# Patient Record
Sex: FEMALE | Race: WHITE | Employment: FULL TIME | URBAN - NONMETROPOLITAN AREA
[De-identification: names, ages, dates, MRNs, and addresses within clinical notes are randomized per-mention and may not be internally consistent; named-entity substitution may affect disease eponyms.]

---
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
## 2020-01-07 ENCOUNTER — HOSPITAL ENCOUNTER (OUTPATIENT)
Age: 20
Discharge: HOME | End: 2020-01-07
Payer: COMMERCIAL

## 2020-01-07 LAB
BASOPHILS # BLD: 0.2 %
BASOPHILS ABSOLUTE: 0 THOU/MM3 (ref 0–0.1)
ELLIPTOCYTES: ABNORMAL
EOSINOPHIL # BLD: 1.9 %
EOSINOPHILS ABSOLUTE: 0.1 THOU/MM3 (ref 0–0.4)
ERYTHROCYTE [DISTWIDTH] IN BLOOD BY AUTOMATED COUNT: 20.8 % (ref 11.5–14.5)
ERYTHROCYTE [DISTWIDTH] IN BLOOD BY AUTOMATED COUNT: 61.4 FL (ref 35–45)
HCT VFR BLD CALC: 39.7 % (ref 37–47)
HEMOGLOBIN: 11.4 GM/DL (ref 12–16)
HYPOCHROMIA: PRESENT
IMMATURE GRANS (ABS): 0 THOU/MM3 (ref 0–0.07)
IMMATURE GRANULOCYTES: 0 %
LYMPHOCYTES # BLD: 32.4 %
LYMPHOCYTES ABSOLUTE: 1.4 THOU/MM3 (ref 1–4.8)
MCH RBC QN AUTO: 23.5 PG (ref 26–33)
MCHC RBC AUTO-ENTMCNC: 28.7 GM/DL (ref 32.2–35.5)
MCV RBC AUTO: 81.9 FL (ref 81–99)
MONOCYTES # BLD: 10.1 %
MONOCYTES ABSOLUTE: 0.4 THOU/MM3 (ref 0.4–1.3)
NUCLEATED RED BLOOD CELLS: 0 /100 WBC
PLATELET # BLD: 235 THOU/MM3 (ref 130–400)
PLATELET ESTIMATE: ADEQUATE
PMV BLD AUTO: 10.6 FL (ref 9.4–12.4)
POIKILOCYTES: ABNORMAL
RBC # BLD: 4.85 MILL/MM3 (ref 4.2–5.4)
SCAN OF BLOOD SMEAR: NORMAL
SEG NEUTROPHILS: 55.4 %
SEGMENTED NEUTROPHILS ABSOLUTE COUNT: 2.4 THOU/MM3 (ref 1.8–7.7)
WBC # BLD: 4.3 THOU/MM3 (ref 4.8–10.8)

## 2020-01-07 PROCEDURE — 85025 COMPLETE CBC W/AUTO DIFF WBC: CPT

## 2020-01-07 PROCEDURE — 36415 COLL VENOUS BLD VENIPUNCTURE: CPT

## 2020-02-26 ENCOUNTER — HOSPITAL ENCOUNTER (EMERGENCY)
Age: 20
Discharge: HOME | End: 2020-02-26
Payer: COMMERCIAL

## 2020-02-26 ENCOUNTER — APPOINTMENT (OUTPATIENT)
Dept: GENERAL RADIOLOGY | Age: 20
End: 2020-02-26
Payer: COMMERCIAL

## 2020-02-26 VITALS
RESPIRATION RATE: 14 BRPM | OXYGEN SATURATION: 100 % | BODY MASS INDEX: 28.19 KG/M2 | SYSTOLIC BLOOD PRESSURE: 109 MMHG | DIASTOLIC BLOOD PRESSURE: 66 MMHG | WEIGHT: 145 LBS | TEMPERATURE: 98.2 F | HEART RATE: 94 BPM

## 2020-02-26 LAB
ANION GAP SERPL CALCULATED.3IONS-SCNC: 10 MEQ/L (ref 8–16)
ANISOCYTOSIS: PRESENT
BASOPHILS # BLD: 0.6 %
BASOPHILS ABSOLUTE: 0 THOU/MM3 (ref 0–0.1)
BUN BLDV-MCNC: 10 MG/DL (ref 7–22)
CALCIUM SERPL-MCNC: 9.3 MG/DL (ref 8.5–10.5)
CHLORIDE BLD-SCNC: 108 MEQ/L (ref 98–111)
CO2: 25 MEQ/L (ref 23–33)
CREAT SERPL-MCNC: 0.5 MG/DL (ref 0.4–1.2)
EKG ATRIAL RATE: 86 BPM
EKG P AXIS: 16 DEGREES
EKG P-R INTERVAL: 120 MS
EKG Q-T INTERVAL: 342 MS
EKG QRS DURATION: 80 MS
EKG QTC CALCULATION (BAZETT): 409 MS
EKG R AXIS: 56 DEGREES
EKG T AXIS: 55 DEGREES
EKG VENTRICULAR RATE: 86 BPM
EOSINOPHIL # BLD: 1.1 %
EOSINOPHILS ABSOLUTE: 0.1 THOU/MM3 (ref 0–0.4)
ERYTHROCYTE [DISTWIDTH] IN BLOOD BY AUTOMATED COUNT: 17.2 % (ref 11.5–14.5)
ERYTHROCYTE [DISTWIDTH] IN BLOOD BY AUTOMATED COUNT: 49.2 FL (ref 35–45)
GLUCOSE BLD-MCNC: 93 MG/DL (ref 70–108)
HCT VFR BLD CALC: 34.1 % (ref 37–47)
HEMOGLOBIN: 9.5 GM/DL (ref 12–16)
HYPOCHROMIA: PRESENT
IMMATURE GRANS (ABS): 0.01 THOU/MM3 (ref 0–0.07)
IMMATURE GRANULOCYTES: 0.2 %
LYMPHOCYTES # BLD: 27.3 %
LYMPHOCYTES ABSOLUTE: 1.8 THOU/MM3 (ref 1–4.8)
MCH RBC QN AUTO: 22.1 PG (ref 26–33)
MCHC RBC AUTO-ENTMCNC: 27.9 GM/DL (ref 32.2–35.5)
MCV RBC AUTO: 79.5 FL (ref 81–99)
MONOCYTES # BLD: 10.4 %
MONOCYTES ABSOLUTE: 0.7 THOU/MM3 (ref 0.4–1.3)
NUCLEATED RED BLOOD CELLS: 0 /100 WBC
OSMOLALITY CALCULATION: 283.7 MOSMOL/KG (ref 275–300)
PLATELET # BLD: 380 THOU/MM3 (ref 130–400)
PMV BLD AUTO: 10.4 FL (ref 9.4–12.4)
POIKILOCYTES: ABNORMAL
POTASSIUM SERPL-SCNC: 4 MEQ/L (ref 3.5–5.2)
PREGNANCY, SERUM: NEGATIVE
RBC # BLD: 4.29 MILL/MM3 (ref 4.2–5.4)
ROULEAUX: SLIGHT
SCAN OF BLOOD SMEAR: NORMAL
SEG NEUTROPHILS: 60.4 %
SEGMENTED NEUTROPHILS ABSOLUTE COUNT: 3.9 THOU/MM3 (ref 1.8–7.7)
SODIUM BLD-SCNC: 143 MEQ/L (ref 135–145)
SPHEROCYTES: ABNORMAL
TSH SERPL DL<=0.05 MIU/L-ACNC: 0.75 UIU/ML (ref 0.4–4.2)
WBC # BLD: 6.5 THOU/MM3 (ref 4.8–10.8)

## 2020-02-26 PROCEDURE — 36415 COLL VENOUS BLD VENIPUNCTURE: CPT

## 2020-02-26 PROCEDURE — 99282 EMERGENCY DEPT VISIT SF MDM: CPT

## 2020-02-26 PROCEDURE — 93005 ELECTROCARDIOGRAM TRACING: CPT | Performed by: PHYSICIAN ASSISTANT

## 2020-02-26 PROCEDURE — 84443 ASSAY THYROID STIM HORMONE: CPT

## 2020-02-26 PROCEDURE — 80048 BASIC METABOLIC PNL TOTAL CA: CPT

## 2020-02-26 PROCEDURE — 84703 CHORIONIC GONADOTROPIN ASSAY: CPT

## 2020-02-26 PROCEDURE — 85025 COMPLETE CBC W/AUTO DIFF WBC: CPT

## 2020-02-26 PROCEDURE — 71046 X-RAY EXAM CHEST 2 VIEWS: CPT

## 2020-02-26 RX ORDER — FERROUS SULFATE 325(65) MG
325 TABLET ORAL 2 TIMES DAILY
Qty: 60 TABLET | Refills: 0 | Status: SHIPPED | OUTPATIENT
Start: 2020-02-26 | End: 2022-05-29

## 2020-02-26 ASSESSMENT — ENCOUNTER SYMPTOMS
EYE ITCHING: 0
BLOOD IN STOOL: 0
ANAL BLEEDING: 0
BACK PAIN: 0
RHINORRHEA: 0
NAUSEA: 0
SHORTNESS OF BREATH: 1
DIARRHEA: 0
EYE DISCHARGE: 0
COUGH: 0
EYE PAIN: 0
SORE THROAT: 0
ABDOMINAL PAIN: 0
VOMITING: 0
COLOR CHANGE: 0
WHEEZING: 0

## 2020-02-26 NOTE — ED PROVIDER NOTES
[facility] [address] COMPLAINT       Chief Complaint   Patient presents with    Other     needs iron checked    Other     wants pregnancy test       Nurses Notes reviewed and I agree except as notedin the HPI. HISTORY OF PRESENT ILLNESS    Brie Verdin is a 23 y.o. female who presents with a complaint of needing her iron checked and wants a pregnancy test. The patient admits to shortness of breath on exertion. The patient states that for the last week she has been craving and eating ice more often and has cold intolerance. She admits to a history of anemia. In spring of 2019 the patient was prescribed iron and had blood transfusions. The patient does not take any medications and has not taken iron supplementation since spring of 2019. The patient also reports that during sexual intercourse with her boyfriend the condom broke. The patient is concerned that she may be pregnant. Location/Symptom: SOB on exertion   Timing/Onset: One week  Context/Setting: climbing stairs  Quality: n/a  Duration: intermittent   Modifying Factors: ambulation  Severity: 0/10  REVIEW OF SYSTEMS     Review of Systems   Constitutional: Positive for fatigue. Negative for activity change, appetite change, chills and fever. HENT: Negative for congestion, ear pain, rhinorrhea and sore throat. Eyes: Negative for pain, discharge and itching. Respiratory: Positive for shortness of breath. Negative for cough and wheezing. Cardiovascular: Negative for chest pain. Gastrointestinal: Negative for abdominal pain, anal bleeding, blood in stool, diarrhea, nausea and vomiting. Endocrine: Positive for cold intolerance. Genitourinary: Negative for difficulty urinating, dysuria and vaginal bleeding. Musculoskeletal: Negative for arthralgias, back pain and myalgias. Skin: Negative for color change and rash.    Neurological: Negative for dizziness, seizures, light-headedness and study.    TECHNIQUE: PA and lateral views the chest.    FINDINGS:  No lobar consolidation. Costophrenic recesses are preserved. No cardiomegaly. No acute osseous findings.                      LABS:   Labs Reviewed   CBC WITH AUTO DIFFERENTIAL - Abnormal; Notable for the following components:       Result Value    Hemoglobin 9.5 (*)     Hematocrit 34.1 (*)     MCV 79.5 (*)     MCH 22.1 (*)     MCHC 27.9 (*)     RDW-CV 17.2 (*)     RDW-SD 49.2 (*)     All other components within normal limits   BASIC METABOLIC PANEL   HCG, SERUM, QUALITATIVE   TSH WITH REFLEX   SCAN OF BLOOD SMEAR   ANION GAP   OSMOLALITY       EMERGENCY DEPARTMENT COURSE:   :    Vitals:    02/26/20 1402 02/26/20 1404 02/26/20 1456   BP: 123/81  109/66   Pulse: 98  94   Resp: 16  14   Temp:  98.2 °F (36.8 °C)    TempSrc:  Oral    SpO2: 100%  100%   Weight: 145 lb (65.8 kg)       Patient was seen history physical exam was performed. Was given. We will start the patient back on iron that she is supposed to be on but has not been on since November. See disposition below    CRITICAL CARE:  None    CONSULTS:  None    PROCEDURES:  None    FINAL IMPRESSION      1. Iron deficiency anemia, unspecified iron deficiency anemia type          DISPOSITION/PLAN   Discharge    PATIENT REFERRED TO:  MOHAN Glass - CNP  [address]  [address]  [phone]    In 2 days        DISCHARGE MEDICATIONS:  Discharge Medication List as of 2/26/2020  3:29 PM      START taking these medications    Details   !! ferrous sulfate 325 (65 Fe) MG tablet Take 1 tablet by mouth 2 times daily, Disp-60 tablet, R-0Print       !! - Potential duplicate medications found. Please discuss with provider.           (Please note that portions of this note were completed with a voice recognitionprogram.  Efforts were made to edit the dictations but occasionally words are mis-transcribed.)    ASHLEIGH Robles            [place], [place]  02/26/20 2039

## 2020-02-26 NOTE — ED TRIAGE NOTES
Patient presents to the ED with concerns of low iron due to fatigue and eating ice chips \"a lot more lately\". She states she had extremely low hgb awhile back and was admitted. She has not been taking iron supplements per advisement from her doctor. She recently had an ovary removed which was the source of the low hgb. She has concerns of a possible pregnancy. VSS.

## 2020-02-27 PROCEDURE — 93010 ELECTROCARDIOGRAM REPORT: CPT | Performed by: INTERNAL MEDICINE

## 2021-12-07 ENCOUNTER — HOSPITAL ENCOUNTER (EMERGENCY)
Age: 21
Discharge: LEFT BEFORE DISCHARGE | End: 2021-12-07
Attending: EMERGENCY MEDICINE
Payer: COMMERCIAL

## 2021-12-07 VITALS
OXYGEN SATURATION: 99 % | RESPIRATION RATE: 18 BRPM | TEMPERATURE: 98 F | SYSTOLIC BLOOD PRESSURE: 139 MMHG | DIASTOLIC BLOOD PRESSURE: 77 MMHG | HEART RATE: 122 BPM

## 2021-12-07 DIAGNOSIS — R11.0 NAUSEA: Primary | ICD-10-CM

## 2021-12-07 PROCEDURE — 99281 EMR DPT VST MAYX REQ PHY/QHP: CPT

## 2021-12-07 NOTE — ED PROVIDER NOTES
[address] [address]          Pt Name: Jhony Saez  MRN: [id number]  [place] 2000  Date of evaluation: 12/7/2021  Treating Resident Physician: Isaac Doe MD  Supervising Physician: Dr. Worrell [id number]       Chief Complaint   Patient presents with    Nausea     History obtained from unobtainable from patient due to patient eloped. HISTORY OF PRESENT ILLNESS    HPI  Jhony Saez is a 24 y.o. female who presents to the emergency department for evaluation of nausea. Patient eloped before I evaluated her. REVIEW OF SYSTEMS   Review of Systems   Unable to perform ROS: Other         PAST MEDICAL AND SURGICAL HISTORY     Past Medical History:   Diagnosis Date    Depression with anxiety     Iron deficiency anemia due to chronic blood loss     Migraine headache      Past Surgical History:   Procedure Laterality Date    WISDOM TOOTH EXTRACTION  04/11/2018    all of them         MEDICATIONS   No current facility-administered medications for this encounter.     Current Outpatient Medications:     ferrous sulfate 325 (65 Fe) MG tablet, Take 1 tablet by mouth 2 times daily, Disp: 60 tablet, Rfl: 0    cyanocobalamin 1000 MCG tablet, Take 2 tablets by mouth daily, Disp: 30 tablet, Rfl: 3    SETLAKIN 0.15-0.03 MG per tablet, Take 1 tablet by mouth daily, Disp: , Rfl: 0    ferrous sulfate 325 (65 Fe) MG tablet, Take 1 tablet by mouth 3 times daily (with meals), Disp: 30 tablet, Rfl: 3    folic acid (FOLVITE) 1 MG tablet, Take 1 tablet by mouth daily, Disp: 30 tablet, Rfl: 3    vitamin C (VITAMIN C) 500 MG tablet, Take 1 tablet by mouth daily, Disp: 30 tablet, Rfl: 3    butalbital-acetaminophen-caffeine (ESGIC) -40 MG per tablet, Take 1 tablet by mouth every 4 hours as needed for Headaches, Disp: 20 tablet, Rfl: 0      SOCIAL HISTORY     Social History     Social History Narrative    Not on file     Social History Tobacco Use    Smoking status: Never Smoker    Smokeless tobacco: Never Used   Vaping Use    Vaping Use: Never used   Substance Use Topics    Alcohol use: No    Drug use: Yes     Types: Marijuana (Weed)         ALLERGIES   No Known Allergies      FAMILY HISTORY     Family History   Problem Relation Age of Onset    Diabetes Mother     High Blood Pressure Mother     Crohn's Disease Father     High Blood Pressure Father     Cancer Maternal Grandmother         lung         PREVIOUS RECORDS   Previous records reviewed. PHYSICAL EXAM     ED Triage Vitals [12/07/21 1610]   BP Temp Temp Source Pulse Resp SpO2 Height Weight   139/77 98 °F (36.7 °C) Oral 122 18 99 % -- --     Initial vital signs and nursing assessment reviewed and abnormal from Hypertension, tachycardia. There is no height or weight on file to calculate BMI. Pulsoximetry is normal per my interpretation. Additional Vital Signs:  Vitals:    12/07/21 1610   BP: 139/77   Pulse: 122   Resp: 18   Temp: 98 °F (36.7 °C)   SpO2: 99%             MEDICAL DECISION MAKING   Initial Assessment:   1. Complaints of nausea. Plan:    Patient eloped before evaluation. I did not have a chance to see her she had left the emergency department before evaluation. ED RESULTS   Laboratory results:  Labs Reviewed   CBC WITH AUTO DIFFERENTIAL   BASIC METABOLIC PANEL   HCG, SERUM, QUALITATIVE       Radiologic studies results:  No orders to display       ED Medications administered this visit: Medications - No data to display      ED COURSE              MEDICATION CHANGES     New Prescriptions    No medications on file         FINAL DISPOSITION     Final diagnoses:   None     Dispo: Eloped      This transcription was electronically signed. Parts of this transcriptions may have been dictated by use of voice recognition software and electronically transcribed, and parts may have been transcribed with the assistance of an ED scribe.  The transcription may contain errors not detected in proofreading. Please refer to my supervising physician's documentation if my documentation differs.     Electronically Signed: Sofía Rodriguez MD, 12/07/21, 4:36 PM          Sofía Rodriguez MD  Resident  12/07/21 4654

## 2021-12-07 NOTE — ED NOTES
Pt left at this time. Pt states that she is going to the other ER.       Maximiliano Bishop, [place]  68/[birth year]/64 0941

## 2021-12-07 NOTE — ED TRIAGE NOTES
Pt to er. Pt c/o nausea. States for past 2 days.  was seen at [facility] yesterday for headache and nausea. States everything was normal. Domenico has had some dizziness also at time.

## 2021-12-08 NOTE — ED PROVIDER NOTES
I did not have a chance to see the patient before she eloped. IMPRESSION  1.  Nausea      Patient eloped from ED     Eliza Gonzalez MD  12/08/21 [address] Concepcion MD  12/10/21 6092

## 2021-12-28 ENCOUNTER — HOSPITAL ENCOUNTER (EMERGENCY)
Age: 21
Discharge: HOME | End: 2021-12-28
Payer: COMMERCIAL

## 2021-12-28 VITALS
OXYGEN SATURATION: 97 % | BODY MASS INDEX: 25.66 KG/M2 | RESPIRATION RATE: 16 BRPM | TEMPERATURE: 98.3 F | WEIGHT: 132 LBS | HEART RATE: 76 BPM | DIASTOLIC BLOOD PRESSURE: 68 MMHG | SYSTOLIC BLOOD PRESSURE: 126 MMHG

## 2021-12-28 DIAGNOSIS — U07.1 COVID-19: Primary | ICD-10-CM

## 2021-12-28 LAB — SARS-COV-2, NAA: DETECTED

## 2021-12-28 PROCEDURE — 99213 OFFICE O/P EST LOW 20 MIN: CPT

## 2021-12-28 PROCEDURE — 87635 SARS-COV-2 COVID-19 AMP PRB: CPT

## 2021-12-28 PROCEDURE — 99202 OFFICE O/P NEW SF 15 MIN: CPT | Performed by: NURSE PRACTITIONER

## 2021-12-28 ASSESSMENT — ENCOUNTER SYMPTOMS
NAUSEA: 0
SHORTNESS OF BREATH: 0
COUGH: 1
DIARRHEA: 0
RHINORRHEA: 0
TROUBLE SWALLOWING: 0
BACK PAIN: 0
VOMITING: 0
SINUS PRESSURE: 0
SORE THROAT: 0

## 2021-12-28 NOTE — ED TRIAGE NOTES
Pt to room 8 with c/o a cough, nasal congestion, loss of taste and smell and states she was told that she was exposed to someone who tested positive for Covid at work.

## 2021-12-28 NOTE — ED PROVIDER NOTES
[facility]       CHIEF COMPLAINT       Chief Complaint   Patient presents with    Concern For COVID-19    Cough    Headache    Other     loss of taste and smell       Nurses Notes reviewed and I agree except as noted in the HPI. HISTORY OF PRESENT ILLNESS   Marnie uSarez is a 24 y.o. female who presents the urgent care center complaining of loss of taste and smell nonproductive cough and a dull headache intermittently over the past 3 or 4 days. Patient states that she was exposed to Covid and does work with public. Patient at present time sitting in chair skin is warm dry patient does not appear to be in any acute distress    The history is provided by the patient. No  was used. REVIEW OF SYSTEMS     Review of Systems   Constitutional: Negative for activity change, appetite change, chills, fatigue and fever. HENT: Negative for congestion, ear pain, rhinorrhea, sinus pressure, sore throat and trouble swallowing. Respiratory: Positive for cough. Negative for shortness of breath. Cardiovascular: Negative for chest pain. Gastrointestinal: Negative for diarrhea, nausea and vomiting. Musculoskeletal: Negative for back pain and neck stiffness. Skin: Negative for rash. Allergic/Immunologic: Negative for environmental allergies. Neurological: Positive for headaches. Negative for dizziness and light-headedness. Hematological: Negative for adenopathy. PAST MEDICAL HISTORY         Diagnosis Date    Depression with anxiety     Iron deficiency anemia due to chronic blood loss     Migraine headache        SURGICALHISTORY     Patient  has a past surgical history that includes [place] tooth extraction (04/11/2018) and Uterine fibroid surgery.     CURRENT MEDICATIONS       Current Discharge Medication List      CONTINUE these medications which have NOT CHANGED    Details   !! ferrous sulfate 325 (65 Fe) MG tablet Take 1 tablet by mouth 2 times daily  Qty: 60 tablet, Refills: 0      cyanocobalamin 1000 MCG tablet Take 2 tablets by mouth daily  Qty: 30 tablet, Refills: 3      SETLAKIN 0.15-0.03 MG per tablet Take 1 tablet by mouth daily  Refills: 0      !! ferrous sulfate 325 (65 Fe) MG tablet Take 1 tablet by mouth 3 times daily (with meals)  Qty: 30 tablet, Refills: 3      folic acid (FOLVITE) 1 MG tablet Take 1 tablet by mouth daily  Qty: 30 tablet, Refills: 3      vitamin C (VITAMIN C) 500 MG tablet Take 1 tablet by mouth daily  Qty: 30 tablet, Refills: 3      butalbital-acetaminophen-caffeine (ESGIC) -40 MG per tablet Take 1 tablet by mouth every 4 hours as needed for Headaches  Qty: 20 tablet, Refills: 0       !! - Potential duplicate medications found. Please discuss with provider. ALLERGIES     Patient is has No Known Allergies. Patients   Immunization History   Administered Date(s) Administered    Influenza Virus Vaccine 09/19/2018       FAMILY HISTORY     Patient's family history includes Cancer in her maternal grandmother; Crohn's Disease in her father; Diabetes in her mother; High Blood Pressure in her father and mother. SOCIAL HISTORY     Patient  reports that she has never smoked. She has never used smokeless tobacco. She reports current drug use. Drug: Marijuana Katelynn Delatorre). She reports that she does not drink alcohol. PHYSICAL EXAM     ED TRIAGE VITALS  BP: 126/68, Temp: 98.3 °F (36.8 °C), Pulse: 76, Resp: 16, SpO2: 97 %,Estimated body mass index is 25.66 kg/m² as calculated from the following:    Height as of 5/7/19: 5' 0.14\" (1.528 m). Weight as of this encounter: 132 lb (59.9 kg). ,Patient's last menstrual period was 11/29/2021. Physical Exam  Vitals and nursing note reviewed. Constitutional:       General: She is not in acute distress. Appearance: Normal appearance. She is well-developed and well-groomed. She is not ill-appearing, toxic-appearing or diaphoretic.    HENT:      Head: Normocephalic. Right Ear: Hearing, tympanic membrane, ear canal and external ear normal. No drainage, swelling or tenderness. No mastoid tenderness. Left Ear: Hearing, tympanic membrane, ear canal and external ear normal. No drainage, swelling or tenderness. No mastoid tenderness. Nose: Nose normal.      Right Sinus: No maxillary sinus tenderness or frontal sinus tenderness. Left Sinus: No maxillary sinus tenderness or frontal sinus tenderness. Mouth/Throat:      Lips: Pink. Mouth: Mucous membranes are moist.      Pharynx: Uvula midline. No posterior oropharyngeal erythema or uvula swelling. Tonsils: No tonsillar exudate or tonsillar abscesses. Eyes:      Conjunctiva/sclera: Conjunctivae normal.      Pupils: Pupils are equal, round, and reactive to light. Cardiovascular:      Rate and Rhythm: Normal rate and regular rhythm. Heart sounds: Normal heart sounds. Pulmonary:      Effort: Pulmonary effort is normal. No accessory muscle usage. Breath sounds: Normal breath sounds. No decreased breath sounds, wheezing, rhonchi or rales. Chest:   Breasts:      Right: No supraclavicular adenopathy. Left: No supraclavicular adenopathy. Abdominal:      General: Bowel sounds are normal.      Palpations: Abdomen is soft. Tenderness: There is no abdominal tenderness. There is no right CVA tenderness, left CVA tenderness or guarding. Negative signs include Oglesby's sign. Musculoskeletal:      Cervical back: Full passive range of motion without pain and normal range of motion. No rigidity. Lymphadenopathy:      Head:      Right side of head: No submental, submandibular, tonsillar, preauricular, posterior auricular or occipital adenopathy. Left side of head: No submental, submandibular, tonsillar, preauricular, posterior auricular or occipital adenopathy. Cervical: No cervical adenopathy.       Right cervical: No superficial, deep or posterior cervical medication as needed. Pt is advised to go to ER if symptoms worsen, new symptoms develop, high fever >100, chest pain or heaviness, breathing difficulty, lethargy to Dial 911 or call [facility] [place] COVID-19 hotline number [phone] or your local [address] [place] [address] Christelle. The patient or patient's representative is agreeable to the treatment plan they're advised to follow-up with her primary care provider in one week for reevaluation.       PATIENT REFERRED TO:  MOHAN Blackmon CNP  [address] [address]      DISCHARGE MEDICATIONS:  Current Discharge Medication List          Current Discharge Medication List          Current Discharge Medication List          MOHAN Holley CNP    (Please note that portions of this note were completed with a voice recognition program. Efforts were made to edit the dictations but occasionally words are mis-transcribed.)           MOHAN Holley CNP  12/28/21 5241

## 2021-12-28 NOTE — LETTER
[facility]  [address]  [address]  Phone: [phone]               December 28, 2021    Patient: Aishwarya Newberry   YOB: 2000   Date of Visit: 12/28/2021       To Whom It May Concern:    Kadi Hackett was seen and treated in our emergency department on 12/28/2021. The patient did test positive for COVID-19 today. They should be off work 10 days from the start of symptoms and fever free for 24 hours or follow the policies of their human resources department. If the patient needs FMLA papers filled out they must go through their primary care provider to have this completed       If there are questions or concerns, please have the patient contact our office.       Sincerely,       MOHAN Sotomayor CNP         Signature:__________________________________

## 2021-12-29 ENCOUNTER — CARE COORDINATION (OUTPATIENT)
Dept: CARE COORDINATION | Age: 21
End: 2021-12-29

## 2021-12-29 NOTE — CARE COORDINATION
Patient contacted regarding COVID-19 diagnosis. Discussed COVID-19 related testing which was available at this time. Test results were positive. Patient informed of results, if available? Yes. Ambulatory Care Manager contacted the patient by telephone to perform post discharge assessment. Call within 2 business days of discharge: Yes. Verified name and  with patient as identifiers. Provided introduction to self, and explanation of the CTN/ACM role, and reason for call due to risk factors for infection and/or exposure to COVID-19. Symptoms reviewed with patient who verbalized the following symptoms: cough, loss of taste or smell and headache. Due to no new or worsening symptoms encounter was not routed to provider for escalation. Discussed follow-up appointments. If no appointment was previously scheduled, appointment scheduling offered: Yes. [address] follow up appointment(s): No future appointments. Non-[facility] follow up appointment(s): patient calling    Non-face-to-face services provided:  Obtained and reviewed discharge summary and/or continuity of care documents     Advance Care Planning:   Does patient have an Advance Directive:  reviewed and current. Educated patient about risk for severe COVID-19 due to risk factors according to CDC guidelines. ACM reviewed discharge instructions, medical action plan and red flag symptoms with the patient who verbalized understanding. Discussed COVID vaccination status: No. Education provided on COVID-19 vaccination as appropriate. Discussed exposure protocols and quarantine with CDC Guidelines. Patient was given an opportunity to verbalize any questions and concerns and agrees to contact ACM or health care provider for questions related to their healthcare. Reviewed and educated patient on any new and changed medications related to discharge diagnosis     Was patient discharged with a pulse oximeter?  No Discussed and confirmed pulse oximeter discharge instructions and when to notify provider or seek emergency care. ACM provided contact information. No further follow-up call identified based on severity of symptoms and risk factors. Advised on zone sheet, symptom management, reporting worsening symptoms, deep breathing exercises, staying active, and hydration.

## 2022-04-24 ENCOUNTER — APPOINTMENT (OUTPATIENT)
Dept: GENERAL RADIOLOGY | Age: 22
End: 2022-04-24
Payer: COMMERCIAL

## 2022-04-24 ENCOUNTER — HOSPITAL ENCOUNTER (EMERGENCY)
Age: 22
Discharge: HOME | End: 2022-04-24
Payer: COMMERCIAL

## 2022-04-24 VITALS
HEART RATE: 80 BPM | SYSTOLIC BLOOD PRESSURE: 121 MMHG | OXYGEN SATURATION: 98 % | RESPIRATION RATE: 17 BRPM | DIASTOLIC BLOOD PRESSURE: 66 MMHG | TEMPERATURE: 98 F

## 2022-04-24 DIAGNOSIS — M79.641 RIGHT HAND PAIN: Primary | ICD-10-CM

## 2022-04-24 PROCEDURE — 73130 X-RAY EXAM OF HAND: CPT

## 2022-04-24 PROCEDURE — 99283 EMERGENCY DEPT VISIT LOW MDM: CPT

## 2022-04-24 ASSESSMENT — PAIN - FUNCTIONAL ASSESSMENT: PAIN_FUNCTIONAL_ASSESSMENT: NONE - DENIES PAIN

## 2022-04-24 ASSESSMENT — ENCOUNTER SYMPTOMS
NAUSEA: 0
COUGH: 0
SORE THROAT: 0
EYE PAIN: 0
SHORTNESS OF BREATH: 0
EYE ITCHING: 0
RHINORRHEA: 0
BACK PAIN: 0
DIARRHEA: 0
VOMITING: 0
SINUS PRESSURE: 0

## 2022-04-24 NOTE — ED PROVIDER NOTES
[facility] [age] COMPLAINT       Chief Complaint   Patient presents with    Hand Injury       Nurses Notes reviewed and I agree except as notedin the HPI. HISTORY OF PRESENT ILLNESS    Emilie Grande is a 24 y.o. female who presents complains of right hand pain is been going on for last few days. The patient states she noticed it when she overuses it. She complains of pain on the extensor surface. She has not noticed any redness. Denies history of injury. She complains of pain palmarly on the lateral aspect of her right wrist.  She denies any numbness or tingling. Denies any carpal tunnel syndrome symptoms. REVIEW OF SYSTEMS     Review of Systems   Constitutional: Negative for activity change, appetite change and fatigue. HENT: Negative for congestion, ear pain, rhinorrhea, sinus pressure and sore throat. Eyes: Negative for pain and itching. Respiratory: Negative for cough and shortness of breath. Cardiovascular: Negative for chest pain and leg swelling. Gastrointestinal: Negative for diarrhea, nausea and vomiting. Endocrine: Negative for polyuria. Musculoskeletal: Negative for back pain and neck pain. Right hand pain   Neurological: Negative for dizziness and headaches. Psychiatric/Behavioral: Negative for agitation, behavioral problems and dysphoric mood. All other systems reviewed and are negative. PAST MEDICAL HISTORY    has a past medical history of Depression with anxiety, Iron deficiency anemia due to chronic blood loss, and Migraine headache. SURGICAL HISTORY      has a past surgical history that includes [place] tooth extraction (04/11/2018) and Uterine fibroid surgery.     CURRENT MEDICATIONS       Discharge Medication List as of 4/24/2022  4:19 PM      CONTINUE these medications which have NOT CHANGED    Details   !! ferrous sulfate 325 (65 Fe) MG tablet Take 1 tablet by mouth 2 times daily, Disp-60 tablet, R-0Print      cyanocobalamin 1000 MCG tablet Take 2 tablets by mouth daily, Disp-30 tablet, R-3Adjust Sig      SETLAKIN 0.15-0.03 MG per tablet Take 1 tablet by mouth daily, R-0, DAWHistorical Med      !! ferrous sulfate 325 (65 Fe) MG tablet Take 1 tablet by mouth 3 times daily (with meals), Disp-30 tablet, [id number]      folic acid (FOLVITE) 1 MG tablet Take 1 tablet by mouth daily, Disp-30 tablet, R-3Print      vitamin C (VITAMIN C) 500 MG tablet Take 1 tablet by mouth daily, Disp-30 tablet, R-3Print      butalbital-acetaminophen-caffeine (ESGIC) -40 MG per tablet Take 1 tablet by mouth every 4 hours as needed for Headaches, Disp-20 tablet, R-0Print       !! - Potential duplicate medications found. Please discuss with provider. ALLERGIES     has No Known Allergies. HISTORY     She indicated that the status of her mother is unknown. She indicated that the status of her father is unknown. She indicated that the status of her maternal grandmother is unknown.   family history includes Cancer in her maternal grandmother; Crohn's Disease in her father; Diabetes in her mother; High Blood Pressure in her father and mother. SOCIALHISTORY      reports that she has never smoked. She has never used smokeless tobacco. She reports current drug use. Drug: Marijuana Richardson Relic). She reports that she does not drink alcohol. PHYSICAL EXAM     INITIAL VITALS:  oral temperature is 98 °F (36.7 °C). Her blood pressure is 121/66 and her pulse is 80. Her respiration is 17 and oxygen saturation is 98%. Physical Exam  Vitals and nursing note reviewed. Constitutional:       Comments: Well Developed Well Nourished Appearing     HENT:      Head: Normocephalic and atraumatic. Eyes:      Pupils: Pupils are equal, round, and reactive to light. Cardiovascular:      Rate and Rhythm: Normal rate and regular rhythm. Heart sounds: Normal heart sounds.    Pulmonary:      Effort: Pulmonary effort is normal. No respiratory distress. Breath sounds: Normal breath sounds. No wheezing. Abdominal:      General: Bowel sounds are normal. There is no distension. Palpations: Abdomen is soft. Musculoskeletal:      Cervical back: Normal range of motion and neck supple. Comments: Full active range of motion of the right hand. No point tenderness. No redness or evidence of cellulitis. DIFFERENTIAL DIAGNOSIS:   Right hand pain    DIAGNOSTIC RESULTS     EKG: All EKG's are interpreted by the Emergency Department Physician who either signs or Co-signs this chart in the absence of a cardiologist.      RADIOLOGY: non-plain film images(s) such as CT, Ultrasound and MRI are read by the radiologist.  XR HAND RIGHT (MIN 3 VIEWS)   Final Result   1. No acute bony abnormality. **This report has been created using voice recognition software. It may contain minor errors which are inherent in voice recognition technology. **      Final report electronically signed by Dr Gregorio Bill on 4/24/2022 2:56 PM            LABS:   Labs Reviewed - No data to display    EMERGENCY DEPARTMENT COURSE:   :    Vitals:    04/24/22 1429   BP: 121/66   Pulse: 80   Resp: 17   Temp: 98 °F (36.7 °C)   TempSrc: Oral   SpO2: 98%     Patient was seen history physical exam was performed. See disposition below    CRITICAL CARE:  None    CONSULTS:  None    PROCEDURES:  None    FINAL IMPRESSION      1.  Right hand pain          DISPOSITION/PLAN   Discharge    PATIENT REFERRED TO:  MOHAN Duque - CNP  [address] [facility]  [phone]    In 1 week        DISCHARGE MEDICATIONS:  Discharge Medication List as of 4/24/2022  4:19 PM      START taking these medications    Details   etodolac (LODINE) 300 MG capsule Take 1 capsule by mouth every 8 hours, Disp-30 capsule, R-0Print             (Please note that portions of this note were completed with a voice recognitionprogram.  Efforts were made to edit the dictations but occasionally words are mis-transcribed.)    Cullen Oreilly, [address] [place], [place]  04/24/22 4194

## 2022-05-29 ENCOUNTER — HOSPITAL ENCOUNTER (EMERGENCY)
Age: 22
Discharge: HOME | End: 2022-05-29
Payer: COMMERCIAL

## 2022-05-29 VITALS
WEIGHT: 132 LBS | BODY MASS INDEX: 24.29 KG/M2 | SYSTOLIC BLOOD PRESSURE: 128 MMHG | HEART RATE: 77 BPM | HEIGHT: 62 IN | RESPIRATION RATE: 14 BRPM | OXYGEN SATURATION: 100 % | TEMPERATURE: 98 F | DIASTOLIC BLOOD PRESSURE: 83 MMHG

## 2022-05-29 DIAGNOSIS — M25.531 RIGHT WRIST PAIN: ICD-10-CM

## 2022-05-29 DIAGNOSIS — R11.2 NON-INTRACTABLE VOMITING WITH NAUSEA, UNSPECIFIED VOMITING TYPE: Primary | ICD-10-CM

## 2022-05-29 PROCEDURE — 99213 OFFICE O/P EST LOW 20 MIN: CPT | Performed by: NURSE PRACTITIONER

## 2022-05-29 PROCEDURE — 99213 OFFICE O/P EST LOW 20 MIN: CPT

## 2022-05-29 RX ORDER — OMEPRAZOLE 20 MG/1
20 CAPSULE, DELAYED RELEASE ORAL
Qty: 30 CAPSULE | Refills: 0 | Status: SHIPPED | OUTPATIENT
Start: 2022-05-29 | End: 2022-07-13

## 2022-05-29 RX ORDER — ONDANSETRON 4 MG/1
4 TABLET, ORALLY DISINTEGRATING ORAL EVERY 8 HOURS PRN
Qty: 30 TABLET | Refills: 0 | Status: SHIPPED | OUTPATIENT
Start: 2022-05-29 | End: 2022-07-13

## 2022-05-29 ASSESSMENT — PAIN DESCRIPTION - FREQUENCY: FREQUENCY: CONTINUOUS

## 2022-05-29 ASSESSMENT — ENCOUNTER SYMPTOMS
COUGH: 0
VOMITING: 1
NAUSEA: 0
CHEST TIGHTNESS: 0
RHINORRHEA: 0
DIARRHEA: 0
SHORTNESS OF BREATH: 0
SORE THROAT: 0

## 2022-05-29 ASSESSMENT — PAIN DESCRIPTION - ORIENTATION: ORIENTATION: RIGHT

## 2022-05-29 ASSESSMENT — PAIN - FUNCTIONAL ASSESSMENT
PAIN_FUNCTIONAL_ASSESSMENT: 0-10
PAIN_FUNCTIONAL_ASSESSMENT: ACTIVITIES ARE NOT PREVENTED

## 2022-05-29 ASSESSMENT — PAIN DESCRIPTION - LOCATION: LOCATION: WRIST

## 2022-05-29 ASSESSMENT — PAIN SCALES - GENERAL: PAINLEVEL_OUTOF10: 6

## 2022-05-29 ASSESSMENT — PAIN DESCRIPTION - DESCRIPTORS: DESCRIPTORS: TINGLING;SHARP

## 2022-05-29 ASSESSMENT — PAIN DESCRIPTION - PAIN TYPE: TYPE: ACUTE PAIN

## 2022-05-29 ASSESSMENT — PAIN DESCRIPTION - ONSET: ONSET: ON-GOING

## 2022-05-29 NOTE — Clinical Note
Lance Goodwin was seen and treated in our emergency department on 5/29/2022. She may return to work on 05/30/2022. If you have any questions or concerns, please don't hesitate to call.       Sj Mast, MOHAN - CNP

## 2022-05-29 NOTE — ED NOTES
PT GIVEN DISCHARGE INSTRUCTIONS, VERBALIZES UNDERSTANDING. PT ASSESSMENT UNCHANGED, DISCHARGED IN STABLE CONDITION.         Lizbeth Dorman RN  05/29/22 3752

## 2022-05-29 NOTE — ED TRIAGE NOTES
Pt co vomiting off and on in the mornings for about the last month. Pt co feeling like her right wrist dislocated and did not reset itself properly about 3 months ago.

## 2022-05-29 NOTE — ED PROVIDER NOTES
[facility]       CHIEF COMPLAINT       Chief Complaint   Patient presents with    Wrist Pain     felt like her wrist popped out of place and didnt realign properly     Emesis     vomitingoff and on for the last month       Nurses Notes reviewed and I agree except as noted in the HPI. HISTORY OF PRESENT ILLNESS   Christian Patel is a 25 y.o. female who presents to the [facility] urgent care for evaluation of emesis and wrist pain. She reports the emesis as intermittent for the last 1 month. She reports it normally wakes her at night around 3 or 4 AM.  She denies chance of pregnancy as she has not had intercourse since November or December. She reports right wrist pain. She reports she was seen and evaluated roughly 3 months ago. She reports that she did wear a Velcro wrist splint on her left wrist for 1 to 2 weeks and the pain resolved. She denies new injury. The history is provided by the patient. No  was used. REVIEW OF SYSTEMS     Review of Systems   Constitutional: Negative for activity change, appetite change, chills, fatigue and fever. HENT: Negative for ear discharge, ear pain, rhinorrhea and sore throat. Respiratory: Negative for cough, chest tightness and shortness of breath. Cardiovascular: Negative for chest pain. Gastrointestinal: Positive for vomiting. Negative for diarrhea and nausea. Genitourinary: Negative for dysuria. Musculoskeletal: Positive for arthralgias. Skin: Negative for rash. Allergic/Immunologic: Negative for environmental allergies and food allergies. Neurological: Negative for dizziness and headaches.        PAST MEDICAL HISTORY         Diagnosis Date    Depression with anxiety     Iron deficiency anemia due to chronic blood loss     Migraine headache        SURGICALHISTORY     Patient  has a past surgical history that includes [place] tooth extraction (04/11/2018) and Uterine fibroid surgery. CURRENT MEDICATIONS       Discharge Medication List as of 5/29/2022 11:31 AM          ALLERGIES     Patient is has No Known Allergies. Patients   Immunization History   Administered Date(s) Administered    Influenza Virus Vaccine 09/19/2018       FAMILY HISTORY     Patient's family history includes Cancer in her maternal grandmother; Crohn's Disease in her father; Diabetes in her mother; High Blood Pressure in her father and mother. SOCIAL HISTORY     Patient  reports that she has never smoked. She has never used smokeless tobacco. She reports current drug use. Frequency: 7.00 times per week. Drug: Marijuana Myrtis Regulus). She reports that she does not drink alcohol. PHYSICAL EXAM     ED TRIAGE VITALS  BP: 128/83, Temp: 98 °F (36.7 °C), Heart Rate: 77, Resp: 14, SpO2: 100 %,Estimated body mass index is 24.14 kg/m² as calculated from the following:    Height as of this encounter: 5' 2\" (1.575 m). Weight as of this encounter: 132 lb (59.9 kg). ,Patient's last menstrual period was 04/28/2022 (approximate). Physical Exam  Vitals and nursing note reviewed. Constitutional:       General: She is not in acute distress. Appearance: Normal appearance. She is not ill-appearing, toxic-appearing or diaphoretic. HENT:      Head: Normocephalic. Right Ear: Ear canal and external ear normal.      Left Ear: Ear canal and external ear normal.      Nose: Nose normal. No congestion or rhinorrhea. Mouth/Throat:      Mouth: Mucous membranes are moist.      Pharynx: Oropharynx is clear. No oropharyngeal exudate or posterior oropharyngeal erythema. Cardiovascular:      Rate and Rhythm: Normal rate. Pulses: Normal pulses. Pulmonary:      Effort: Pulmonary effort is normal. No respiratory distress. Breath sounds: No stridor. No wheezing or rhonchi. Abdominal:      General: Abdomen is flat. Bowel sounds are normal.      Palpations: Abdomen is soft.    Musculoskeletal: General: No swelling or tenderness. Normal range of motion. Cervical back: Normal range of motion. Neurological:      General: No focal deficit present. Mental Status: She is alert and oriented to person, place, and time. Psychiatric:         Mood and Affect: Mood normal.         Behavior: Behavior normal.         DIAGNOSTIC RESULTS     Labs:No results found for this visit on 05/29/22. IMAGING:    No orders to display         EKG: None      URGENT CARE COURSE:     Vitals:    05/29/22 1059   BP: 128/83   Pulse: 77   Resp: 14   Temp: 98 °F (36.7 °C)   TempSrc: Temporal   SpO2: 100%   Weight: 132 lb (59.9 kg)   Height: 5' 2\" (1.575 m)       Medications - No data to display         PROCEDURES:  None    FINAL IMPRESSION      1. Non-intractable vomiting with nausea, unspecified vomiting type    2. Right wrist pain          DISPOSITION/ PLAN     Patient seen and evaluated for emesis and wrist pain. I did recommend to have a urinalysis to evaluate for pregnancy, with the patient declined. She is provided prescriptions for Zofran and Prilosec for emesis. She is instructed to wear her Velcro wrist splint for 1 to 2 weeks. She is instructed to follow-up with her PCP in 3 to 5 days with worsening symptoms. She did request a work note for today's visit. She also is being seen with her roommate today. She is agreeable to above plan and denies questions or concerns at this time.     PATIENT REFERRED TO:  MOHAN Monaco - CNP  [address] [address] / [facility] [id number]      DISCHARGE MEDICATIONS:  Discharge Medication List as of 5/29/2022 11:31 AM      START taking these medications    Details   ondansetron (ZOFRAN ODT) 4 MG disintegrating tablet Take 1 tablet by mouth every 8 hours as needed for Nausea or Vomiting, Disp-30 tablet, R-0Print      omeprazole (PRILOSEC) 20 MG delayed release capsule Take 1 capsule by mouth every morning (before breakfast), Disp-30 capsule, R-0Print             Discharge Medication List as of 5/29/2022 11:31 AM      STOP taking these medications       etodolac (LODINE) 300 MG capsule Comments:   Reason for Stopping:         ferrous sulfate 325 (65 Fe) MG tablet Comments:   Reason for Stopping:         cyanocobalamin 1000 MCG tablet Comments:   Reason for Stopping:         Anna [place]lin 0.15-0.03 MG per tablet Comments:   Reason for Stopping:         ferrous sulfate 325 (65 Fe) MG tablet Comments:   Reason for Stopping:         folic acid (FOLVITE) 1 MG tablet Comments:   Reason for Stopping:         vitamin C (VITAMIN C) 500 MG tablet Comments:   Reason for Stopping:         butalbital-acetaminophen-caffeine (ESGIC) -40 MG per tablet Comments:   Reason for Stopping:               Discharge Medication List as of 5/29/2022 11:31 AM          MOHAN Mojica CNP    (Please note that portions of this note were completed with a voice recognition program. Efforts were made to edit the dictations but occasionally words are mis-transcribed.)           MOHAN Mojica CNP  05/29/22 1145

## 2022-07-13 ENCOUNTER — HOSPITAL ENCOUNTER (EMERGENCY)
Age: 22
Discharge: HOME | End: 2022-07-13
Attending: NURSE PRACTITIONER
Payer: COMMERCIAL

## 2022-07-13 VITALS
WEIGHT: 135 LBS | DIASTOLIC BLOOD PRESSURE: 79 MMHG | HEART RATE: 76 BPM | SYSTOLIC BLOOD PRESSURE: 113 MMHG | BODY MASS INDEX: 24.84 KG/M2 | TEMPERATURE: 98.4 F | OXYGEN SATURATION: 100 % | HEIGHT: 62 IN | RESPIRATION RATE: 16 BRPM

## 2022-07-13 DIAGNOSIS — K52.9 GASTROENTERITIS: Primary | ICD-10-CM

## 2022-07-13 PROCEDURE — 99212 OFFICE O/P EST SF 10 MIN: CPT | Performed by: NURSE PRACTITIONER

## 2022-07-13 PROCEDURE — 99213 OFFICE O/P EST LOW 20 MIN: CPT

## 2022-07-13 RX ORDER — ONDANSETRON 4 MG/1
4 TABLET, FILM COATED ORAL EVERY 8 HOURS PRN
Qty: 12 TABLET | Refills: 0 | Status: SHIPPED | OUTPATIENT
Start: 2022-07-13 | End: 2022-09-26

## 2022-07-13 ASSESSMENT — ENCOUNTER SYMPTOMS
EYE DISCHARGE: 0
COUGH: 0
EYE ITCHING: 0
TROUBLE SWALLOWING: 0
EYE PAIN: 0
ABDOMINAL PAIN: 1
SORE THROAT: 0
NAUSEA: 0
VOMITING: 1
CONSTIPATION: 0
SINUS PRESSURE: 0
DIARRHEA: 1
EYE REDNESS: 0
RHINORRHEA: 0
BACK PAIN: 0
WHEEZING: 0
CHEST TIGHTNESS: 0
SHORTNESS OF BREATH: 0

## 2022-07-13 ASSESSMENT — PAIN - FUNCTIONAL ASSESSMENT: PAIN_FUNCTIONAL_ASSESSMENT: NONE - DENIES PAIN

## 2022-07-13 NOTE — ED TRIAGE NOTES
Pt to [facility] ambulatory with nausea, vomiting, and diarrhea. This started yesterday.

## 2022-07-13 NOTE — Clinical Note
Noah Nino was seen and treated in our emergency department on 7/13/2022. She may return to work on 07/15/2022. If you have any questions or concerns, please don't hesitate to call.       Sujata Whitney, APRN - CNP

## 2022-07-13 NOTE — ED PROVIDER NOTES
[address]       Chief Complaint   Patient presents with    Nausea    Emesis    Diarrhea       Nurses Notes reviewed and I agree except as noted in the HPI. HISTORY OF PRESENT ILLNESS   Britt Chan is a 25 y.o. female who presents valuation of nausea, vomiting, diarrhea for the past 2 days. Patient notes that she vomited once yesterday she has had about 5 loose stools today. She denies any fever or chills. No chest pain shortness of breath cough or sore throat. REVIEW OF SYSTEMS     Review of Systems   Constitutional: Negative for activity change, appetite change, chills, fatigue and fever. HENT: Negative for congestion, ear discharge, ear pain, hearing loss, postnasal drip, rhinorrhea, sinus pressure, sore throat and trouble swallowing. Eyes: Negative for pain, discharge, redness and itching. Respiratory: Negative for cough, chest tightness, shortness of breath and wheezing. Cardiovascular: Negative for chest pain, palpitations and leg swelling. Gastrointestinal: Positive for abdominal pain, diarrhea and vomiting. Negative for constipation and nausea. Endocrine: Negative. Genitourinary: Negative for dysuria, flank pain, frequency and hematuria. Musculoskeletal: Negative for arthralgias, back pain, joint swelling and myalgias. Skin: Negative. Neurological: Negative for dizziness, light-headedness and headaches. Hematological: Negative. PAST MEDICAL HISTORY         Diagnosis Date    Depression with anxiety     Iron deficiency anemia due to chronic blood loss     Migraine headache        SURGICAL HISTORY     Patient  has a past surgical history that includes [place] tooth extraction (04/11/2018) and Uterine fibroid surgery. CURRENT MEDICATIONS       Discharge Medication List as of 7/13/2022  1:08 PM          ALLERGIES     Patient is has No Known Allergies.     FAMILY HISTORY     Patient'sfamily history includes Cancer in her maternal grandmother; Crohn's Disease in her father; Diabetes in her mother; High Blood Pressure in her father and mother. SOCIAL HISTORY     Patient  reports that she has never smoked. She has never used smokeless tobacco. She reports current drug use. Frequency: 7.00 times per week. Drug: Marijuana Yuni Mary). She reports that she does not drink alcohol. PHYSICAL EXAM     ED TRIAGE VITALS  BP: 113/79, Temp: 98.4 °F (36.9 °C), Heart Rate: 76, Resp: 16, SpO2: 100 %  Physical Exam  Vitals and nursing note reviewed. Constitutional:       General: She is not in acute distress. Appearance: She is well-developed. HENT:      Head: Normocephalic and atraumatic. Right Ear: External ear normal.      Left Ear: External ear normal.      Nose: Nose normal.      Mouth/Throat:      Pharynx: No oropharyngeal exudate. Eyes:      Conjunctiva/sclera: Conjunctivae normal.      Pupils: Pupils are equal, round, and reactive to light. Neck:      Thyroid: No thyromegaly. Vascular: No JVD. Trachea: No tracheal deviation. Cardiovascular:      Rate and Rhythm: Normal rate and regular rhythm. Heart sounds: Normal heart sounds. No murmur heard. No friction rub. No gallop. Pulmonary:      Effort: Pulmonary effort is normal. No respiratory distress. Breath sounds: Normal breath sounds. No stridor. No wheezing or rales. Chest:      Chest wall: No tenderness. Abdominal:      General: Bowel sounds are normal.      Palpations: Abdomen is soft. Tenderness: There is no abdominal tenderness. Musculoskeletal:      Cervical back: Normal range of motion and neck supple. Lymphadenopathy:      Cervical: No cervical adenopathy. Skin:     General: Skin is warm and dry. Neurological:      Mental Status: She is alert and oriented to person, place, and time.    Psychiatric:         Behavior: Behavior normal.         Judgment: Judgment normal.         DIAGNOSTIC RESULTS Labs: No results found for this visit on 07/13/22. IMAGING:  No orders to display     URGENT CARE COURSE:     Vitals:    07/13/22 1237   BP: 113/79   Pulse: 76   Resp: 16   Temp: 98.4 °F (36.9 °C)   TempSrc: Temporal   SpO2: 100%   Weight: 135 lb (61.2 kg)   Height: 5' 2\" (1.575 m)       Medications - No data to display  PROCEDURES:  None  FINALIMPRESSION      1.  Gastroenteritis        DISPOSITION/PLAN   DISPOSITION Decision To Discharge 07/13/2022 12:57:34 PM    PATIENT REFERRED TO:  Cloyde Malling, APRN - CNP  [address]  [phone]    Schedule an appointment as soon as possible for a visit in 2 days  If symptoms worsen    DISCHARGE MEDICATIONS:  Discharge Medication List as of 7/13/2022  1:08 PM      START taking these medications    Details   ondansetron (ZOFRAN) 4 MG tablet Take 1 tablet by mouth every 8 hours as needed for Nausea, Disp-12 tablet, R-0Normal           Discharge Medication List as of 7/13/2022  1:08 PM          MOHAN Guerra CNP, APRN - CNP  07/13/22 7001

## 2022-09-19 ENCOUNTER — HOSPITAL ENCOUNTER (EMERGENCY)
Age: 22
Discharge: HOME | End: 2022-09-19

## 2022-09-19 VITALS
DIASTOLIC BLOOD PRESSURE: 79 MMHG | OXYGEN SATURATION: 100 % | BODY MASS INDEX: 24.48 KG/M2 | SYSTOLIC BLOOD PRESSURE: 120 MMHG | WEIGHT: 133 LBS | RESPIRATION RATE: 16 BRPM | TEMPERATURE: 98.2 F | HEART RATE: 91 BPM | HEIGHT: 62 IN

## 2022-09-19 DIAGNOSIS — Z32.01 POSITIVE PREGNANCY TEST: Primary | ICD-10-CM

## 2022-09-19 LAB — PREGNANCY, URINE: POSITIVE

## 2022-09-19 PROCEDURE — 81025 URINE PREGNANCY TEST: CPT

## 2022-09-19 PROCEDURE — 99283 EMERGENCY DEPT VISIT LOW MDM: CPT

## 2022-09-19 RX ORDER — PNV NO.95/FERROUS FUM/FOLIC AC 28MG-0.8MG
1 TABLET ORAL DAILY
Qty: 30 TABLET | Refills: 0 | Status: SHIPPED | OUTPATIENT
Start: 2022-09-19

## 2022-09-19 ASSESSMENT — ENCOUNTER SYMPTOMS
COLOR CHANGE: 0
ABDOMINAL PAIN: 0
VOMITING: 0
CHEST TIGHTNESS: 0
SHORTNESS OF BREATH: 0
ABDOMINAL DISTENTION: 0
NAUSEA: 0

## 2022-09-19 ASSESSMENT — PAIN - FUNCTIONAL ASSESSMENT: PAIN_FUNCTIONAL_ASSESSMENT: NONE - DENIES PAIN

## 2022-09-19 NOTE — ED NOTES
Patient presents to the ED wanting a pregnancy test. She states that she took a home pregnancy test this morning and that the results was positive. She states that she needs proof of her pregnancy on paper for her job. She has no other complaints at this time.       Anita Velasquez LPN  68/71/[id number]

## 2022-09-19 NOTE — ED PROVIDER NOTES
[facility] Emergency Department    CHIEF COMPLAINT       Chief Complaint   Patient presents with    Pregnancy Test       Nurses Notes reviewed and I agree except as noted in the HPI. HISTORY OF PRESENT ILLNESS    Britt Chan is a 25 y.o. female who presents to the ED for evaluation of pregnancy test.  Patient notes last menstrual period was August 15, she is usually by approximately 4 days. She denies ever being pregnant before. She notes increased breast tenderness, increased urination, and a craving for ice cream.  She denies being pregnant in the past.  He has a history of an ovarian cyst removal in 2019. She denies any other past medical history. She took a home pregnancy test that was positive and she comes to the ER today for proof for her job. HPI was provided by the patient. REVIEW OF SYSTEMS     Review of Systems   Constitutional:  Negative for activity change, chills, fatigue and fever. Respiratory:  Negative for chest tightness and shortness of breath. Cardiovascular:  Negative for chest pain. Gastrointestinal:  Negative for abdominal distention, abdominal pain, nausea and vomiting. Endocrine: Positive for polyuria. Genitourinary:  Positive for frequency. Negative for decreased urine volume, difficulty urinating, vaginal bleeding, vaginal discharge and vaginal pain. Skin:  Negative for color change and rash. Allergic/Immunologic: Negative for immunocompromised state. Neurological:  Negative for dizziness, weakness, light-headedness, numbness and headaches. Hematological:  Does not bruise/bleed easily. Psychiatric/Behavioral:  Negative for agitation, behavioral problems and confusion.        PAST MEDICAL HISTORY     Past Medical History:   Diagnosis Date    Depression with anxiety     Iron deficiency anemia due to chronic blood loss     Migraine headache        SURGICALHISTORY      has a past surgical history that includes [place] tooth extraction (04/11/2018) and Uterine fibroid surgery. CURRENT MEDICATIONS       Discharge Medication List as of 9/19/2022 11:01 AM        CONTINUE these medications which have NOT CHANGED    Details   ondansetron (ZOFRAN) 4 MG tablet Take 1 tablet by mouth every 8 hours as needed for Nausea, Disp-12 tablet, R-0Normal             ALLERGIES     has No Known Allergies. FAMILY HISTORY     She indicated that the status of her mother is unknown. She indicated that the status of her father is unknown. She indicated that the status of her maternal grandmother is unknown.   family history includes Cancer in her maternal grandmother; Crohn's Disease in her father; Diabetes in her mother; High Blood Pressure in her father and mother. SOCIAL HISTORY       Social History     Socioeconomic History    Marital status: Single     Spouse name: Not on file    Number of children: Not on file    Years of education: Not on file    Highest education level: Not on file   Occupational History    Not on file   Tobacco Use    Smoking status: Never    Smokeless tobacco: Never   Vaping Use    Vaping Use: Never used   Substance and Sexual Activity    Alcohol use: No    Drug use: Yes     Frequency: 7.0 times per week     Types: Marijuana Nataliia Escobedo    Sexual activity: Not Currently     Partners: Male   Other Topics Concern    Not on file   Social History Narrative    Not on file     Social Determinants of Health     Financial Resource Strain: Not on file   Food Insecurity: Not on file   Transportation Needs: Not on file   Physical Activity: Not on file   Stress: Not on file   Social Connections: Not on file   Intimate Partner Violence: Not on file   Housing Stability: Not on file       PHYSICAL EXAM     INITIAL VITALS:  height is 5' 2\" (1.575 m) and weight is 133 lb (60.3 kg). Her oral temperature is 98.2 °F (36.8 °C). Her blood pressure is 120/79 and her pulse is 91. Her respiration is 16 and oxygen saturation is 100%.     Physical Exam  Vitals and nursing note reviewed. Constitutional:       Appearance: Normal appearance. She is well-developed. HENT:      Head: Normocephalic. Mouth/Throat:      Pharynx: Uvula midline. Eyes:      Conjunctiva/sclera: Conjunctivae normal.   Cardiovascular:      Rate and Rhythm: Normal rate. Pulmonary:      Effort: Pulmonary effort is normal. No respiratory distress. Breath sounds: Normal breath sounds. Chest:      Chest wall: No tenderness. Abdominal:      General: Bowel sounds are normal. There is no distension. Palpations: Abdomen is soft. Tenderness: There is no abdominal tenderness. Musculoskeletal:         General: Normal range of motion. Cervical back: Normal range of motion and neck supple. Lymphadenopathy:      Cervical: No cervical adenopathy. Skin:     General: Skin is warm and dry. Neurological:      Mental Status: She is alert and oriented to person, place, and time. Psychiatric:         Speech: Speech normal.         Behavior: Behavior normal.         Thought Content: Thought content normal.       DIFFERENTIAL DIAGNOSIS:   Pregnancy test  DIAGNOSTIC RESULTS       RADIOLOGY: non-plainfilm images(s) such as CT, Ultrasound and MRI are read by the radiologist.  Plain radiographic images are visualized and preliminarily interpreted by the emergency physician unless otherwise stated below. No orders to display         LABS:   Labs Reviewed   PREGNANCY, URINE       EMERGENCY DEPARTMENT COURSE:   Vitals:    Vitals:    09/19/22 0909   BP: 120/79   Pulse: 91   Resp: 16   Temp: 98.2 °F (36.8 °C)   TempSrc: Oral   SpO2: 100%   Weight: 133 lb (60.3 kg)   Height: 5' 2\" (1.575 m)         MDM  Patient was seen and evaluated in the emergency department, patient appeared to be in no acute distress, vital signs reviewed, no significant findings are noted. Physical exam is completed, no significant abnormalities noted. Urine pregnancy test was obtained and positive.   Patient is prescribed prenatal vitamin, she is given follow-up appointment with family medicine OB clinic. She is advised to return to the ER with worsening symptoms. Medications - No data to display    Patient was seenindependently by myself. The patient's final impression and disposition and plan was determined by myself. CRITICAL CARE:   None    CONSULTS:  None    PROCEDURES:  None    FINAL IMPRESSION     1. Positive pregnancy test          DISPOSITION/PLAN   Patient discharged    PATIENT REFERREDTO:  [address] [place] [address],[address] [place] [address]. [facility]  Go in 1 week  For follow up and evaluation    DISCHARGE MEDICATIONS:  Discharge Medication List as of 9/19/2022 11:01 AM        START taking these medications    Details   Prenatal Vit-Fe Fumarate-FA (PRENATAL VITAMIN) 27-0.8 MG TABS Take 1 tablet by mouth daily, Disp-30 tablet, R-0Normal             (Please note that portions of this note were completed with a voice recognition program.  Efforts were made to edit the dictations but occasionally words are mis-transcribed.)        Provider:  I personally performed the services described in the documentation,reviewed and edited the documentation which was dictated to the scribe in my presence, and it accurately records my words and actions.     Gama Rodriguez CNP 09/19/22 3:11 PM    Luciano Rodriguez, APRN - CNP         [place], APRN - CNP  09/19/22 4892

## 2022-09-25 ENCOUNTER — APPOINTMENT (OUTPATIENT)
Dept: ULTRASOUND IMAGING | Age: 22
End: 2022-09-25
Payer: OTHER MISCELLANEOUS

## 2022-09-25 ENCOUNTER — HOSPITAL ENCOUNTER (EMERGENCY)
Age: 22
Discharge: HOME | End: 2022-09-25
Attending: EMERGENCY MEDICINE
Payer: OTHER MISCELLANEOUS

## 2022-09-25 VITALS
HEART RATE: 80 BPM | SYSTOLIC BLOOD PRESSURE: 121 MMHG | RESPIRATION RATE: 16 BRPM | DIASTOLIC BLOOD PRESSURE: 73 MMHG | TEMPERATURE: 97.7 F | WEIGHT: 132 LBS | OXYGEN SATURATION: 100 % | BODY MASS INDEX: 24.14 KG/M2

## 2022-09-25 DIAGNOSIS — Z3A.01 LESS THAN 8 WEEKS GESTATION OF PREGNANCY: ICD-10-CM
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
## 2022-09-25 LAB
ABO: NORMAL
ALBUMIN SERPL-MCNC: 4.2 G/DL (ref 3.5–5.1)
ALP BLD-CCNC: 68 U/L (ref 38–126)
ALT SERPL-CCNC: 8 U/L (ref 11–66)
ANION GAP SERPL CALCULATED.3IONS-SCNC: 11 MEQ/L (ref 8–16)
ANTIBODY SCREEN: NORMAL
AST SERPL-CCNC: 16 U/L (ref 5–40)
BACTERIA: ABNORMAL /HPF
BASOPHILS # BLD: 0.5 %
BASOPHILS ABSOLUTE: 0 THOU/MM3 (ref 0–0.1)
BILIRUB SERPL-MCNC: 1.3 MG/DL (ref 0.3–1.2)
BILIRUBIN URINE: NEGATIVE
BLOOD, URINE: NEGATIVE
BUN BLDV-MCNC: 9 MG/DL (ref 7–22)
CALCIUM SERPL-MCNC: 9.8 MG/DL (ref 8.5–10.5)
CASTS 2: ABNORMAL /LPF
CASTS UA: ABNORMAL /LPF
CHARACTER, URINE: ABNORMAL
CHLORIDE BLD-SCNC: 106 MEQ/L (ref 98–111)
CO2: 22 MEQ/L (ref 23–33)
COLOR: YELLOW
CREAT SERPL-MCNC: 0.5 MG/DL (ref 0.4–1.2)
CRYSTALS, UA: ABNORMAL
EOSINOPHIL # BLD: 0.8 %
EOSINOPHILS ABSOLUTE: 0.1 THOU/MM3 (ref 0–0.4)
EPITHELIAL CELLS, UA: ABNORMAL /HPF
ERYTHROCYTE [DISTWIDTH] IN BLOOD BY AUTOMATED COUNT: 14.3 % (ref 11.5–14.5)
ERYTHROCYTE [DISTWIDTH] IN BLOOD BY AUTOMATED COUNT: 48.2 FL (ref 35–45)
GFR SERPL CREATININE-BSD FRML MDRD: > 90 ML/MIN/1.73M2
GLUCOSE BLD-MCNC: 87 MG/DL (ref 70–108)
GLUCOSE URINE: NEGATIVE MG/DL
HCG,BETA SUBUNIT,QUAL,SERUM: 4672 MIU/ML (ref 0–5)
HCT VFR BLD CALC: 42.4 % (ref 37–47)
HEMOGLOBIN: 13.8 GM/DL (ref 12–16)
IMMATURE GRANS (ABS): 0.01 THOU/MM3 (ref 0–0.07)
IMMATURE GRANULOCYTES: 0.1 %
KETONES, URINE: ABNORMAL
LEUKOCYTE ESTERASE, URINE: NEGATIVE
LYMPHOCYTES # BLD: 19 %
LYMPHOCYTES ABSOLUTE: 1.6 THOU/MM3 (ref 1–4.8)
MCH RBC QN AUTO: 30.1 PG (ref 26–33)
MCHC RBC AUTO-ENTMCNC: 32.5 GM/DL (ref 32.2–35.5)
MCV RBC AUTO: 92.6 FL (ref 81–99)
MISCELLANEOUS 2: ABNORMAL
MONOCYTES # BLD: 7.9 %
MONOCYTES ABSOLUTE: 0.7 THOU/MM3 (ref 0.4–1.3)
MUCUS: ABNORMAL
NITRITE, URINE: NEGATIVE
NUCLEATED RED BLOOD CELLS: 0 /100 WBC
OSMOLALITY CALCULATION: 275.6 MOSMOL/KG (ref 275–300)
PH UA: 6 (ref 5–9)
PLATELET # BLD: 226 THOU/MM3 (ref 130–400)
PMV BLD AUTO: 10.7 FL (ref 9.4–12.4)
POTASSIUM REFLEX MAGNESIUM: 3.8 MEQ/L (ref 3.5–5.2)
PREGNANCY, SERUM: POSITIVE
PROTEIN UA: NEGATIVE
RBC # BLD: 4.58 MILL/MM3 (ref 4.2–5.4)
RBC URINE: ABNORMAL /HPF
RENAL EPITHELIAL, UA: ABNORMAL
RH FACTOR: NORMAL
SEG NEUTROPHILS: 71.7 %
SEGMENTED NEUTROPHILS ABSOLUTE COUNT: 6 THOU/MM3 (ref 1.8–7.7)
SODIUM BLD-SCNC: 139 MEQ/L (ref 135–145)
SPECIFIC GRAVITY, URINE: 1.02 (ref 1–1.03)
TOTAL PROTEIN: 7.3 G/DL (ref 6.1–8)
UROBILINOGEN, URINE: 0.2 EU/DL (ref 0–1)
WBC # BLD: 8.3 THOU/MM3 (ref 4.8–10.8)
WBC UA: ABNORMAL /HPF
YEAST: ABNORMAL

## 2022-09-25 PROCEDURE — 6370000000 HC RX 637 (ALT 250 FOR IP): Performed by: STUDENT IN AN ORGANIZED HEALTH CARE EDUCATION/TRAINING PROGRAM

## 2022-09-25 PROCEDURE — 84703 CHORIONIC GONADOTROPIN ASSAY: CPT

## 2022-09-25 PROCEDURE — 87086 URINE CULTURE/COLONY COUNT: CPT

## 2022-09-25 PROCEDURE — 86900 BLOOD TYPING SEROLOGIC ABO: CPT

## 2022-09-25 PROCEDURE — 2580000003 HC RX 258: Performed by: STUDENT IN AN ORGANIZED HEALTH CARE EDUCATION/TRAINING PROGRAM

## 2022-09-25 PROCEDURE — 86901 BLOOD TYPING SEROLOGIC RH(D): CPT

## 2022-09-25 PROCEDURE — 76817 TRANSVAGINAL US OBSTETRIC: CPT

## 2022-09-25 PROCEDURE — 99284 EMERGENCY DEPT VISIT MOD MDM: CPT

## 2022-09-25 PROCEDURE — 84702 CHORIONIC GONADOTROPIN TEST: CPT

## 2022-09-25 PROCEDURE — 85025 COMPLETE CBC W/AUTO DIFF WBC: CPT

## 2022-09-25 PROCEDURE — 80053 COMPREHEN METABOLIC PANEL: CPT

## 2022-09-25 PROCEDURE — 86850 RBC ANTIBODY SCREEN: CPT

## 2022-09-25 PROCEDURE — 81001 URINALYSIS AUTO W/SCOPE: CPT

## 2022-09-25 RX ORDER — 0.9 % SODIUM CHLORIDE 0.9 %
1000 INTRAVENOUS SOLUTION INTRAVENOUS ONCE
Status: COMPLETED | OUTPATIENT
Start: 2022-09-25 | End: 2022-09-25

## 2022-09-25 RX ORDER — ACETAMINOPHEN 500 MG
1000 TABLET ORAL ONCE
Status: COMPLETED | OUTPATIENT
Start: 2022-09-25 | End: 2022-09-25

## 2022-09-25 RX ADMIN — SODIUM CHLORIDE 1000 ML: 9 INJECTION, SOLUTION INTRAVENOUS at 11:08

## 2022-09-25 RX ADMIN — ACETAMINOPHEN 1000 MG: 500 TABLET ORAL at 10:55

## 2022-09-25 ASSESSMENT — PAIN - FUNCTIONAL ASSESSMENT
PAIN_FUNCTIONAL_ASSESSMENT: 0-10

## 2022-09-25 ASSESSMENT — PAIN DESCRIPTION - LOCATION
LOCATION: HEAD

## 2022-09-25 ASSESSMENT — ENCOUNTER SYMPTOMS
CONSTIPATION: 0
NAUSEA: 0
SHORTNESS OF BREATH: 0
COUGH: 0
SINUS PAIN: 0
BACK PAIN: 0
ABDOMINAL PAIN: 0
VOMITING: 0
EYE PAIN: 0
DIARRHEA: 0

## 2022-09-25 ASSESSMENT — PAIN DESCRIPTION - PAIN TYPE
TYPE: ACUTE PAIN

## 2022-09-25 ASSESSMENT — PAIN DESCRIPTION - FREQUENCY
FREQUENCY: CONTINUOUS
FREQUENCY: CONTINUOUS

## 2022-09-25 ASSESSMENT — PAIN SCALES - GENERAL
PAINLEVEL_OUTOF10: 3
PAINLEVEL_OUTOF10: 2
PAINLEVEL_OUTOF10: 5

## 2022-09-25 ASSESSMENT — PAIN DESCRIPTION - DESCRIPTORS
DESCRIPTORS: ACHING

## 2022-09-25 NOTE — ED NOTES
Pt remains EDIN at [address]. Will round on pt when returns.      Alma Arce RN  09/25/22 8331

## 2022-09-25 NOTE — DISCHARGE INSTRUCTIONS
You can use Tylenol as needed for pain  Make sure you eat and drink plenty of fluids to stay well-hydrated  Make sure to follow-up with your OB/GYN tomorrow.   Return to the ED if your symptoms worsen, or you begin having a severe headache that does not respond to Tylenol, you are having severe nausea and vomiting, you have confusion or weird behaviors or feel you need to be reevaluated  If you need a family doctor you can call [facility] to find and establish a PCP for long-term care
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
## 2022-09-25 NOTE — ED NOTES
In for hourly rounding. Pt resting on cot in position of comfort. Pt remains A&Ox4, resps easy and unlabored. IV shows no s/s of infection or infiltration. Pt pain remains unchanged at this time. Monitor remains in place. Updated pt on POC. Will monitor.        Akash Pena RN  09/25/22 1300 (2) probably inadequate
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
## 2022-09-26 ENCOUNTER — INITIAL PRENATAL (OUTPATIENT)
Dept: OBGYN CLINIC | Age: 22
End: 2022-09-26

## 2022-09-26 ENCOUNTER — OFFICE VISIT (OUTPATIENT)
Dept: FAMILY MEDICINE CLINIC | Age: 22
End: 2022-09-26

## 2022-09-26 VITALS
WEIGHT: 125.6 LBS | BODY MASS INDEX: 23.11 KG/M2 | HEART RATE: 78 BPM | OXYGEN SATURATION: 99 % | TEMPERATURE: 97.9 F | DIASTOLIC BLOOD PRESSURE: 80 MMHG | SYSTOLIC BLOOD PRESSURE: 122 MMHG | HEIGHT: 62 IN | RESPIRATION RATE: 16 BRPM

## 2022-09-26 DIAGNOSIS — Z3A.01 LESS THAN 8 WEEKS GESTATION OF PREGNANCY: Primary | ICD-10-CM

## 2022-09-26 DIAGNOSIS — Z00.00 ENCOUNTER FOR MEDICAL EXAMINATION TO ESTABLISH CARE: Primary | ICD-10-CM

## 2022-09-26 DIAGNOSIS — R79.89 LOW TSH LEVEL: ICD-10-CM

## 2022-09-26 LAB
ALCOHOL URINE: NORMAL
AMPHETAMINE SCREEN, URINE: NORMAL
BARBITURATE SCREEN, URINE: NORMAL
BENZODIAZEPINE SCREEN, URINE: NORMAL
BILIRUBIN URINE: NEGATIVE
BLOOD URINE, POC: NEGATIVE
BUPRENORPHINE URINE: NORMAL
CHARACTER, URINE: CLEAR
COCAINE METABOLITE SCREEN URINE: NORMAL
COLOR, URINE: YELLOW
FENTANYL SCREEN, URINE: NORMAL
GABAPENTIN SCREEN, URINE: NORMAL
GLUCOSE URINE: NEGATIVE MG/DL
KETONES, URINE: ABNORMAL
LEUKOCYTE CLUMPS, URINE: NEGATIVE
MDMA URINE: NORMAL
METHADONE SCREEN, URINE: NORMAL
METHAMPHETAMINE, URINE: NORMAL
NITRITE, URINE: NEGATIVE
OPIATE SCREEN URINE: NORMAL
ORGANISM: ABNORMAL
OXYCODONE SCREEN URINE: NORMAL
PH, URINE: 6.5 (ref 5–9)
PHENCYCLIDINE SCREEN URINE: NORMAL
PROPOXYPHENE SCREEN, URINE: NORMAL
PROTEIN, URINE: NEGATIVE MG/DL
SPECIFIC GRAVITY, URINE: >= 1.03 (ref 1–1.03)
SYNTHETIC CANNABINOIDS(K2) SCREEN, URINE: NORMAL
THC SCREEN, URINE: NORMAL
TRAMADOL SCREEN URINE: NORMAL
TRICYCLIC ANTIDEPRESSANTS, UR: NORMAL
URINE CULTURE REFLEX: ABNORMAL
UROBILINOGEN, URINE: 0.2 EU/DL (ref 0–1)

## 2022-09-26 PROCEDURE — 99203 OFFICE O/P NEW LOW 30 MIN: CPT | Performed by: FAMILY MEDICINE

## 2022-09-26 SDOH — ECONOMIC STABILITY: FOOD INSECURITY: WITHIN THE PAST 12 MONTHS, THE FOOD YOU BOUGHT JUST DIDN'T LAST AND YOU DIDN'T HAVE MONEY TO GET MORE.: OFTEN TRUE

## 2022-09-26 SDOH — ECONOMIC STABILITY: FOOD INSECURITY: WITHIN THE PAST 12 MONTHS, YOU WORRIED THAT YOUR FOOD WOULD RUN OUT BEFORE YOU GOT MONEY TO BUY MORE.: OFTEN TRUE

## 2022-09-26 ASSESSMENT — PATIENT HEALTH QUESTIONNAIRE - PHQ9
1. LITTLE INTEREST OR PLEASURE IN DOING THINGS: 0
2. FEELING DOWN, DEPRESSED OR HOPELESS: 0
SUM OF ALL RESPONSES TO PHQ QUESTIONS 1-9: 0
SUM OF ALL RESPONSES TO PHQ QUESTIONS 1-9: 0
SUM OF ALL RESPONSES TO PHQ9 QUESTIONS 1 & 2: 0
SUM OF ALL RESPONSES TO PHQ QUESTIONS 1-9: 0
SUM OF ALL RESPONSES TO PHQ QUESTIONS 1-9: 0

## 2022-09-26 ASSESSMENT — SOCIAL DETERMINANTS OF HEALTH (SDOH): HOW HARD IS IT FOR YOU TO PAY FOR THE VERY BASICS LIKE FOOD, HOUSING, MEDICAL CARE, AND HEATING?: VERY HARD

## 2022-09-26 ASSESSMENT — ENCOUNTER SYMPTOMS
DIARRHEA: 0
NAUSEA: 0
COUGH: 0
ABDOMINAL PAIN: 1
CONSTIPATION: 1
VOMITING: 0
SHORTNESS OF BREATH: 0
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
## 2022-09-26 NOTE — PATIENT INSTRUCTIONS
[address] [facility]           We are pleased you have chosen our office for your OB care and delivery. We deliver all our patients at [place] [address]. Please do not go to [facility] as we will not be able to care for you. This includes the emergency room as well as labor and delivery. 12[address] [facility]    [address] [place] [facility], [address] [facility],[address]  MD Lamar Garza, MD [facility], [address] [place] [address], MD Rubina Hernandez, MD Hayde Culp, MD Ghada Sutton, DO Barbara Taylor, MD Eileen Layne, MD Corry Ellison, MD Lito Stephens, DO Cleve Vasques, MD Winter Noland, MD Airam Mooney, MD Eve Staton, DO  [facility] -- [facility]  Family Medicine  [address], [address]  [phone]  MD Felecia Morales MD Armen Mash, [address], [address] [facility] -- [address] [place]. 885 [facility], [address]  1800 [facility], DO  [facility], [address] [facility]  Medicine  [address] [facility] [address]  6[address] [facility], . [address]  [facility]., MD Efe Leiva, FLORES Campbell, 505 [facility], 4001 [facility] -- [address]  [address] [facility] , [address]  [phone]  MD Giovany Sandoval, CNP  Sunil Tuttle, [address] [facility] -- [address] 8800 [facility],4Th Floor, [address]00 [facility]  [phone]  [facility], [address]  12 [facility] [place], 9920 [facility]  [address], MD Jamie Rodriguez, [address]  Family Medicine  1324 [facility], [address] [place] [address], [address] [place] [address] message. Please leave a return phone number so we can call you back. If you have not heard back within 24 hours, please call again and be sure your message and phone number are left on the Lactation Consultant's voicemail. Drinking Milk to Prevent [address] [facility]    Mother's Drink Milk to Prevent MS      A new study shows drinking milk during pregnancy could boost protection against the debilitating condition multiple sclerosis (MS) later in life. The study included nearly 36,000 nurses whose mothers participates in surveys in 2001 noting their diets during pregnancy. Of those numbers, 199 developed MS over the 16-year period. The authors found women born to mothers with a high intake of milk and Vitamin D during pregnancy were at a lower risk of developing MD.    The risk of MS among daughters whose mothers consumed four glasses of milk per day was 56% lower than daughters whose mothers consumed less than three glasses of milk per day. Janneth Desouza M.D. of [place] [address] in [place] was quoted as saying, Simone Ropes is growing evidence that the vitamin D has an effect on MS. The results of this study suggest that this effect may begin in the womb. [facility]          Hyperemesis (Nausea and Vomiting) Treatments      Over the counter Medicines:    1 Pepcid 10-20 mg, daily at bedtime    Unisom (doxylamine), bedtime    Vitamin B6 100mg, twice daily    Benadryl 25 mg. by mouth, up to once every 6 hours      Prescription Medicines:    Phenergan 25-50 mg, oral / rectal suppositories    Zofran 4-8 mg    Reglan 10 mg        Sex During Pregnancy  43[address] [facility]    Pregnancy has far-reaching effects on a family - not the least of which is sexuality.  Although pregnancy does bring some inevitable changes to a relationship, it does not mean the end of sexual Quit      Are you thinking of having a baby or are you already pregnant? If you are a smoker, you have probably also been thinking about putting away the cigarettes for good! Pregnancy can be such a great motivation for quitting! You are excited about this pregnancy and you want to give your baby every chance for good health. And if you have tried to quite in the past and were less than successful, take heart! The person most likely to be successful in quitting is someone who has tried to quit before. Is it important that you quit for pregnancy? Yes, most definitely. Smoking cigarettes can, and very often does, harm the fetus. Everyone already knows smoking mothers tend to have smaller babies: The warning is on every package of cigarettes. But to some mothers, a smaller baby doesn't sound all that bad. The problem lies in the fact that anything that blocks or slows the growth of the baby can also slow the growth of the baby's brain. Cigarette smoke contains hundreds of chemicals, many of which can cause problems for a developing fetus. Smoking can reduce the amount of oxygen and nutrients your baby receives by constructing the vessels that transport blood to the baby. Carbon monoxide, which reaches higher levels in the fetus than in the mother, reduces the amount of oxygen available for the developing baby by as much as 20%. Nicotine causes fetal blood vessels to constrict, thus further reducing fetal oxygen and nourishment. After a woman quits, her blood oxygen level can go up as much as 8 percent within 48 hours. Cyanide in cigarettes can reduce the baby's ability to process vitamin B12, which developing babies need to manufacture protein and red blood cells. Smoking Increases the risk of birth defects, pre-term births, miscarriages, and low-birth weight. Babies exposed to nicotine in the womb average one-half pound less weight and one-half inch shorter in length.  They also tend to be harder to calm when crying. Sudden Infant Death Syndrome (SIDS) is twice as frequent among babies whose mothers smoke during pregnancy, which suggests damage to respiratory centers in the brain ([employer]ians [employer]necologists 0040). Children whose mothers smoked during pregnancy score lower in reading and math, they tend to grow more at a slower rate. There is some evidence that being exposed to cigarette smoke during infancy predisposes the child to cigarette addiction in adulthood. This has raised the question of whether that applies to smoking during pregnancy, an extremely likely situation. Even secondhand smoke has been associated with the above affects on newborns. Pregnant women should avoid smokers, ask household members who smoke to smoke outside, and, when in public, sit in nonsmoking areas. Need extra help quitting? You can find it at this website, [employer]. smokefree. gov          Pain with Pregnancy & After [address] [facility] [address] and Gynecology    [address] [place] [address], [address]  Phone: [phone]  Fax: [phone]    Monica Lozada    Being pregnant and having a new baby can be a wonderful experience, but it is not always pain free. Women may experience pain in different ways. The pain can be in forms of mild discomfort to severe pain. There are many medications to treat pain. The goal, especially when you are pregnant, is to keep you and the baby's exposure to medications and drugs as low as possible. This booklet will provide you with tools to improve your comfort during and after pregnancy. Our goal is to decrease your need for medications and drugs that could impact the alcira of you and your baby. PAIN RELIEF DURING PREGNANCY     Sciatica  Sciatica happens when a large nerve, which runs through the joint between the tail bone and the hip bone becomes compressed. This can cause shooting pain down the leg on either side of your body. Sometimes, it may cause weakness and can get worse as pregnancy progresses. The following comfort measures may help you manage the pain. Apply ice to the joint in the low back, located at the dimples on either side of the spine. Try over the counter medications such as Acetaminophen (Tylenol). Ask your provider for instructions on stretches like the pelvic tilt. Ask your provider about alternative pain medications. Ask provider for alternative pain medications  Take a magnesium supplement daily  If your tension headache returns often, you may want to look at your workspace for good body mechanics. PAIN RELIEF AFTER DELIVERY     Three types of pain are common after delivering a baby. Incision pain from    Pain from tear or episiotomy  Uterine cramping  NSAID (nonsteroidal anti-inflammatory), Ibuprofen, Motrin or Advil, medications help relieve pain. They do this by decreasing the swelling and blocking chemicals that make muscles sore. For many kinds of pain, NSAIDS provider better pain relief with fewer side effects than narcotics. NSAIDS are available over the counter.  Incisional Pain   pain is usually most intense in the first few days after surgery, but it becomes less over time. An abdominal support binder can help support your stomach. This may help your incision not tug or pull so much when you move, breathe deeply, or cough. This can reduce your pain. Often around-the-clock scheduled doses of NSAIDS can decrease the need for narcotic pain medications. HEADACHES    Migraine   If you have migraine headaches with your periods, they may become worse at the beginning of your pregnancy. This is when nausea and fatigue are common. They usually improve in the second trimester.  If migraines continue or get worse in middle to late pregnancy, you may try:   Increase water intake to ½ gallon a day  Acetaminophen (Tylenol) along with a caffeine drink such as pop, tea, or coffee  Rest in a dark room  If these tips do not help, ask your provider about other options. Tension  If your headache is not like a typical migraine with light sensitivity, nausea, and sometimes a warning aura, you may have a tension headache. This type of headache feels like the top of your head is being compressed from your forehead to the back of your head. If may also include a tight neck and shoulder muscles. Increase water to at least ½ gallon a day  Acetaminophen (Tylenol) and rest  Using an ice pack and then a hot pack, each 15 minutes, on the back of your head/neck until you feel the muscle tension relax  Massage and stretching the neck and shoulder muscles can help keep the headache from returning          To help prevent sciatica:  Use good posture and lower back support while sitting. Wear shoes with good arch support. Try not to carry heavy bags on the same shoulder all the time   Practice exercises that strengthen your core or stomach muscles like yoga, swimming, and pelvic tilts. If the pain does not resolve, seek help from your provider. You may also ask your provider is chiropractic care is right for you. Pubic Bone Pain  This discomfort may range from a nagging ache to a sharp pain. It is located in the center of the pubic bone and may be worse when walking or lifting. Sleeping with a pillow between your legs can provide comfort. Ice can help for a short time along with rest.   Acetaminophen (Tylenol)        Edema  Edema is the swelling of the skin of the legs and feet, which is common in late pregnancy. It can be worse if standing or sitting for long periods, eating salty foods, or in the heat of summer. Rest on your side, on a bed or couch for 40 minutes, after work and dinner. Avoid salty foods and drinks, such as Gatorade, unless your provider recommends them. Swimming, water aerobics, or relaxing in a swimming pool may also help.

## 2022-09-27 LAB
ORGANISM: ABNORMAL
URINE CULTURE, ROUTINE: ABNORMAL

## 2022-09-29 LAB
C. TRACHOMATIS DNA,THIN PREP: NEGATIVE
N. GONORRHOEAE DNA, THIN PREP: NEGATIVE
SOURCE: NORMAL

## 2022-10-04 ENCOUNTER — CARE COORDINATION (OUTPATIENT)
Dept: CARE COORDINATION | Age: 22
End: 2022-10-04

## 2022-10-05 ENCOUNTER — HOSPITAL ENCOUNTER (OUTPATIENT)
Age: 22
Discharge: HOME | End: 2022-10-05

## 2022-10-05 DIAGNOSIS — Z3A.01 LESS THAN 8 WEEKS GESTATION OF PREGNANCY: ICD-10-CM

## 2022-10-05 LAB
ANTIBODY SCREEN: NORMAL
HBV SURFACE AB TITR SER: NEGATIVE {TITER}
HEPATITIS C ANTIBODY: NEGATIVE
HIV AG/AB: NONREACTIVE
RPR: NONREACTIVE

## 2022-10-05 PROCEDURE — 86762 RUBELLA ANTIBODY: CPT

## 2022-10-05 PROCEDURE — 86850 RBC ANTIBODY SCREEN: CPT

## 2022-10-05 PROCEDURE — 36415 COLL VENOUS BLD VENIPUNCTURE: CPT

## 2022-10-05 PROCEDURE — 86706 HEP B SURFACE ANTIBODY: CPT

## 2022-10-05 PROCEDURE — 87389 HIV-1 AG W/HIV-1&-2 AB AG IA: CPT

## 2022-10-05 PROCEDURE — 86592 SYPHILIS TEST NON-TREP QUAL: CPT

## 2022-10-05 PROCEDURE — 86803 HEPATITIS C AB TEST: CPT

## 2022-10-07 LAB
CYTOLOGY THIN PREP PAP: NORMAL
RUBV IGG SER QL: 37.4 IU/ML

## 2022-10-10 ENCOUNTER — TELEPHONE (OUTPATIENT)
Dept: OBGYN CLINIC | Age: 22
End: 2022-10-10

## 2022-10-10 ENCOUNTER — HOSPITAL ENCOUNTER (OUTPATIENT)
Dept: ULTRASOUND IMAGING | Age: 22
Discharge: HOME | End: 2022-10-10

## 2022-10-10 DIAGNOSIS — Z11.3 SCREENING EXAMINATION FOR STD (SEXUALLY TRANSMITTED DISEASE): Primary | ICD-10-CM

## 2022-10-10 DIAGNOSIS — Z3A.01 LESS THAN 8 WEEKS GESTATION OF PREGNANCY: ICD-10-CM

## 2022-10-10 PROCEDURE — 76801 OB US < 14 WKS SINGLE FETUS: CPT

## 2022-10-15 LAB
APTIMA MEDIA TYPE: NORMAL
T. VAGINALIS SPECIMEN SOURCE: NORMAL
TRICHOMONAS VAGINALIS BY NAA: NEGATIVE

## 2022-10-17 ENCOUNTER — ROUTINE PRENATAL (OUTPATIENT)
Dept: OBGYN CLINIC | Age: 22
End: 2022-10-17
Payer: COMMERCIAL

## 2022-10-17 VITALS
OXYGEN SATURATION: 99 % | WEIGHT: 120.4 LBS | TEMPERATURE: 98.1 F | HEIGHT: 62 IN | BODY MASS INDEX: 22.16 KG/M2 | DIASTOLIC BLOOD PRESSURE: 70 MMHG | HEART RATE: 87 BPM | RESPIRATION RATE: 20 BRPM | SYSTOLIC BLOOD PRESSURE: 122 MMHG

## 2022-10-17 DIAGNOSIS — Z98.890 HISTORY OF MYOMECTOMY: ICD-10-CM

## 2022-10-17 DIAGNOSIS — Z3A.08 8 WEEKS GESTATION OF PREGNANCY: Primary | ICD-10-CM

## 2022-10-17 PROBLEM — D64.9 SYMPTOMATIC ANEMIA: Status: RESOLVED | Noted: 2019-03-19 | Resolved: 2022-10-17

## 2022-10-17 PROBLEM — D50.0 IRON DEFICIENCY ANEMIA DUE TO CHRONIC BLOOD LOSS: Status: RESOLVED | Noted: 2019-03-20 | Resolved: 2022-10-17

## 2022-10-17 LAB
BILIRUBIN URINE: NEGATIVE
BLOOD URINE, POC: NORMAL
CHARACTER, URINE: CLEAR
COLOR, URINE: YELLOW
GLUCOSE URINE: NEGATIVE MG/DL
KETONES, URINE: NEGATIVE
LEUKOCYTE CLUMPS, URINE: NEGATIVE
NITRITE, URINE: NEGATIVE
PH, URINE: 6.5 (ref 5–9)
PROTEIN, URINE: NEGATIVE MG/DL
SPECIFIC GRAVITY, URINE: >= 1.03 (ref 1–1.03)
UROBILINOGEN, URINE: 0.2 EU/DL (ref 0–1)

## 2022-10-17 PROCEDURE — G8484 FLU IMMUNIZE NO ADMIN: HCPCS | Performed by: OBSTETRICS & GYNECOLOGY

## 2022-10-17 PROCEDURE — G8427 DOCREV CUR MEDS BY ELIG CLIN: HCPCS | Performed by: OBSTETRICS & GYNECOLOGY

## 2022-10-17 PROCEDURE — G8420 CALC BMI NORM PARAMETERS: HCPCS | Performed by: OBSTETRICS & GYNECOLOGY

## 2022-10-17 PROCEDURE — 1036F TOBACCO NON-USER: CPT | Performed by: OBSTETRICS & GYNECOLOGY

## 2022-10-17 PROCEDURE — 99213 OFFICE O/P EST LOW 20 MIN: CPT | Performed by: OBSTETRICS & GYNECOLOGY

## 2022-10-17 NOTE — PATIENT INSTRUCTIONS
69 Radha Mae and Gynecology           We are pleased you have chosen our office for your OB care and delivery. We deliver all our patients at [facility]. Please do not go to [facility] as we will not be able to care for you. This includes the emergency room as well as labor and delivery. 1221 [facility]    [address]unal 51 [facility], 312 [facility],[address]  Cristina Ballesteros, MD Alexi Espinoza, MD Baudilio BrambilaNYC [facility], [address] [place] [address], MD Francesco Alvarez, MD Francisco Romano, DO  Rian Camacho, MD Dashawn Power, MD Scott Esparza, DO  Darryle Stade, MD Raphael Acosta, MD Yashira Pena, MD Heriberto Apple, DO  Jag Pagan, MD Coleen Macias, MD Priscila Stevens, MD Frank Bolden, DO  [facility]) -- [facility] Family Medicine  [address], [address]  [phone]  MD Haley Oquendo MD Earla Fogo, [address], 4001 [facility] -- [address] Kunal[address] 885 [facility], [address]  1800 [facility], DO  [facility], [facility] -- [facility]  Medicine  [facility] [facility] 3  [facility], [address]  [facility] 66., MD Henri Wilson, FLORES Oneal, [facility], [facility] -- [address]  [address] [facility] , [address]  [phone]  MD Eric Gamino, FLORES Lloyd, [facility] -- 355 [facility] 8800 [facility],4Th Floor, 2400 [facility]  [phone]  Saintclair Laith, [address]  12 [facility], [address] [place] [address]  2000 [facility], MD Marah Vernon, [address]  Family Medicine  1324 [facility]  [facility], [facility]0 [facility], [address] [place] [address] 408 [facility] 25, [address]   Cty Luis Mohamud MD  [place] Form, [address] -- 216 [facility] [address]  1800 [address] 164 [facility] MD Kasey Traylor MD Cherylin Gamer, [address] -- [address]  [address]16 [facility] [address] [facility] [address], [facility] 3212  MD Jerod Baptiste MD [facility], [address] -- Twin Manny Neal 855  [facility], [address] [place] [address]  [phone]  MD Jostin Sepulveda, CNP      Breastfeeding [address] [facility]       Breastfeeding Clinic at [address] [place] [address]      Every Monday, 50 Minute Appointments are available 1:00PM-4:00PM  Located in conference room on 5F    Call [phone] to schedule an appointment as needed  Free 50-minute Outpatient Appointment with 2-3 patients per Lactation Consultant  Falls weight checks are available  A Lactation consultant will be available to assist you with breastfeeding concerns and/or problems      Directions:  Use  or Park in the front parking lots, using a Gib Valdez is a good idea  Coming from the front entrance of the hospital, go right toward the [employer], following signs to the E7F elevators. Pass the [employer] and turn left at the first hallway. Then, turn right at next hallway (look like a doorway), following signs for E and F elevators. Follow quan to E and F elevators. Take either elevator to the 5th floor. From E elevator: turn left off elevator and follow left around corner and through door (will see F elevator). Turn left and conference room is in the corner on the left. From F elevator: turn right off elevator and conference room is in the corner on the left.       For additional breastfeeding questions for our Lactation consultant call [phone] and leave a detailed message. Please leave a return phone number so we can call you back. If you have not heard back within 24 hours, please call again and be sure your message and phone number are left on the Lactation Consultant's voicemail. Drinking Milk to Prevent [address] [facility]ne[facility]    Mother's Drink Milk to Prevent MS      A new study shows drinking milk during pregnancy could boost protection against the debilitating condition multiple sclerosis (MS) later in life. The study included nearly 36,000 nurses whose mothers participates in surveys in 2001 noting their diets during pregnancy. Of those numbers, 199 developed MS over the 16-year period. The authors found women born to mothers with a high intake of milk and Vitamin D during pregnancy were at a lower risk of developing MD.    The risk of MS among daughters whose mothers consumed four glasses of milk per day was 56% lower than daughters whose mothers consumed less than three glasses of milk per day. Zaid Larsen M.D. of [place] [address] in [place] was quoted as saying, Dona Johnson is growing evidence that the vitamin D has an effect on MS. The results of this study suggest that this effect may begin in the womb. [facility]          Hyperemesis (Nausea and Vomiting) Treatments      Over the counter Medicines:    1 Pepcid 10-20 mg, daily at bedtime    Unisom (doxylamine), bedtime    Vitamin B6 100mg, twice daily    Benadryl 25 mg. by mouth, up to once every 6 hours      Prescription Medicines:    Phenergan 25-50 mg, oral / rectal suppositories    Zofran 4-8 mg    Reglan 10 mg        Sex During Pregnancy  4300 [facility] Obstetrics and [facility]necology    Pregnancy has far-reaching effects on a family - not the least of which is sexuality.  Although pregnancy does bring some inevitable changes to a relationship, it does not mean the end of sexual expression. Couples who are happy together sexually before pregnancy, should do well afterward. Although there will be changes due to infant care and so forth, sexual activity can continue during and after pregnancy. If there are no medical conditions ruling out intercourse (your healthcare provider will mention of there are), you and your partner should be able to continue intercourse throughout your pregnancy. If medical problems exist, your provider may suggest that you avoid intercourse. Your Body & Mind    Some physical and emotional changes may affect your sexual desire. During the first trimester, your breasts may be unusually tender; if so, mention this to your partner. If you find that you need to urinate more frequently, emptying your bladder before intercourse may make intimacy more pleasurable. As your abdomen increases in size, the man-on-top position may become uncomfortable for you; if so, try another position. Vaginal lubrication may decrease as the pregnancy advances, so longer foreplay may be necessary, and, if needed, a water-soluble lubricant such as Replens or K-Y jelly may be used (avoid oil-based lubricants such as Vaseline). Any sexual activity that involves any sort of trauma to your abdomen or pelvis, however mild, should be avoided. Avoid drugs during pregnancy, especially drugs used to enhance sexual sensations. After Delivery    You will be sore in the pelvic area for a while after giving birth. You may resume gentle intercourse once you have seen your provider at the post-partum visit. If you are sore after six weeks, contact your physician. If you are breastfeeding, you may notice milk from the breast during sexual stimulation - this is normal.    If this is your first child, there will be major changes in your lifestyle. It is important for you and your partner to allow time for one another during this period.          Smoking During Pregnancy- How to Quit      Are you thinking of having a baby or are you already pregnant? If you are a smoker, you have probably also been thinking about putting away the cigarettes for good! Pregnancy can be such a great motivation for quitting! You are excited about this pregnancy and you want to give your baby every chance for good health. And if you have tried to quite in the past and were less than successful, take heart! The person most likely to be successful in quitting is someone who has tried to quit before. Is it important that you quit for pregnancy? Yes, most definitely. Smoking cigarettes can, and very often does, harm the fetus. Everyone already knows smoking mothers tend to have smaller babies: The warning is on every package of cigarettes. But to some mothers, a smaller baby doesn't sound all that bad. The problem lies in the fact that anything that blocks or slows the growth of the baby can also slow the growth of the baby's brain. Cigarette smoke contains hundreds of chemicals, many of which can cause problems for a developing fetus. Smoking can reduce the amount of oxygen and nutrients your baby receives by constructing the vessels that transport blood to the baby. Carbon monoxide, which reaches higher levels in the fetus than in the mother, reduces the amount of oxygen available for the developing baby by as much as 20%. Nicotine causes fetal blood vessels to constrict, thus further reducing fetal oxygen and nourishment. After a woman quits, her blood oxygen level can go up as much as 8 percent within 48 hours. Cyanide in cigarettes can reduce the baby's ability to process vitamin B12, which developing babies need to manufacture protein and red blood cells. Smoking Increases the risk of birth defects, pre-term births, miscarriages, and low-birth weight. Babies exposed to nicotine in the womb average one-half pound less weight and one-half inch shorter in length.  They also tend to be harder to calm when crying. Sudden Infant Death Syndrome (SIDS) is twice as frequent among babies whose mothers smoke during pregnancy, which suggests damage to respiratory centers in the brain ([employer] of Obstetricians and Gynecologists 1934). Children whose mothers smoked during pregnancy score lower in reading and math, they tend to grow more at a slower rate. There is some evidence that being exposed to cigarette smoke during infancy predisposes the child to cigarette addiction in adulthood. This has raised the question of whether that applies to smoking during pregnancy, an extremely likely situation. Even secondhand smoke has been associated with the above affects on newborns. Pregnant women should avoid smokers, ask household members who smoke to smoke outside, and, when in public, sit in nonsmoking areas. Need extra help quitting? You can find it at this website, Destinee Caraballo. smokefree. gov          Pain with Pregnancy & After [address] [facility] [address] and Gynecology    [address] 24[address], [address]  Phone: [phone]  Fax: [phone]    Zoe Loazda    Being pregnant and having a new baby can be a wonderful experience, but it is not always pain free. Women may experience pain in different ways. The pain can be in forms of mild discomfort to severe pain. There are many medications to treat pain. The goal, especially when you are pregnant, is to keep you and the baby's exposure to medications and drugs as low as possible. This booklet will provide you with tools to improve your comfort during and after pregnancy. Our goal is to decrease your need for medications and drugs that could impact the alcira of you and your baby. PAIN RELIEF DURING PREGNANCY     Sciatica  Sciatica happens when a large nerve, which runs through the joint between the tail bone and the hip bone becomes compressed. This can cause shooting pain down the leg on either side of your body. Sometimes, it may cause weakness and can get worse as pregnancy progresses. The following comfort measures may help you manage the pain. Apply ice to the joint in the low back, located at the dimples on either side of the spine. Try over the counter medications such as Acetaminophen (Tylenol). Ask your provider for instructions on stretches like the pelvic tilt. Ask your provider about alternative pain medications. Ask provider for alternative pain medications  Take a magnesium supplement daily  If your tension headache returns often, you may want to look at your workspace for good body mechanics. PAIN RELIEF AFTER DELIVERY     Three types of pain are common after delivering a baby. Incision pain from    Pain from tear or episiotomy  Uterine cramping  NSAID (nonsteroidal anti-inflammatory), Ibuprofen, Motrin or Advil, medications help relieve pain. They do this by decreasing the swelling and blocking chemicals that make muscles sore. For many kinds of pain, NSAIDS provider better pain relief with fewer side effects than narcotics. NSAIDS are available over the counter.  Incisional Pain   pain is usually most intense in the first few days after surgery, but it becomes less over time. An abdominal support binder can help support your stomach. This may help your incision not tug or pull so much when you move, breathe deeply, or cough. This can reduce your pain. Often around-the-clock scheduled doses of NSAIDS can decrease the need for narcotic pain medications. HEADACHES    Migraine   If you have migraine headaches with your periods, they may become worse at the beginning of your pregnancy. This is when nausea and fatigue are common. They usually improve in the second trimester.  If migraines continue or get worse in middle to late pregnancy, you may try:   Increase water intake to ½ gallon a day  Acetaminophen (Tylenol) along with a caffeine drink such as pop, tea, or coffee  Rest in a dark room  If these tips do not help, ask your provider about other options. Tension  If your headache is not like a typical migraine with light sensitivity, nausea, and sometimes a warning aura, you may have a tension headache. This type of headache feels like the top of your head is being compressed from your forehead to the back of your head. If may also include a tight neck and shoulder muscles. Increase water to at least ½ gallon a day  Acetaminophen (Tylenol) and rest  Using an ice pack and then a hot pack, each 15 minutes, on the back of your head/neck until you feel the muscle tension relax  Massage and stretching the neck and shoulder muscles can help keep the headache from returning          To help prevent sciatica:  Use good posture and lower back support while sitting. Wear shoes with good arch support. Try not to carry heavy bags on the same shoulder all the time   Practice exercises that strengthen your core or stomach muscles like yoga, swimming, and pelvic tilts. If the pain does not resolve, seek help from your provider. You may also ask your provider is chiropractic care is right for you. Pubic Bone Pain  This discomfort may range from a nagging ache to a sharp pain. It is located in the center of the pubic bone and may be worse when walking or lifting. Sleeping with a pillow between your legs can provide comfort. Ice can help for a short time along with rest.   Acetaminophen (Tylenol)        Edema  Edema is the swelling of the skin of the legs and feet, which is common in late pregnancy. It can be worse if standing or sitting for long periods, eating salty foods, or in the heat of summer. Rest on your side, on a bed or couch for 40 minutes, after work and dinner. Avoid salty foods and drinks, such as Gatorade, unless your provider recommends them. Swimming, water aerobics, or relaxing in a swimming pool may also help. Episiotomy/Laceration   This pain may be improved with an ice pack placed to the sore area. Try this for at least 24 hours. NSAIDS can help decrease pain and swelling. Using warm water in a squirt bottle called a jennifer bottle can help as well. Fill the bottle with warm water each time you go to the restroom. Run the water over the sore area as you urinate. You may want to use a stool softener or laxative to keep from having to strain and push against the stitches. Uterine Contractions and Cramping After Delivery   This pain can be controlled with NSAIDS  NSAIDS are more effective and have fewer side effects for this kind of pain than narcotics. Move around and use heat to your abdomen, such as a heating pad or a rice bag. Your uterus may cramp more intensely when breastfeeding. Staying on a steady dose of NSAIDS may help to avoid an increase in pain while breastfeeding. If you are experiencing consistent pain, try a dose of NSAID unless you are allergic or have other medical issues. If you take this medication around the clock, a lot of your pain can be controlled. Keep taking NSAID on a schedule, for at least one week. Use narcotic medications such as Dennie Rachel, etc., only as you need them to be able to move around and care for yourself and for your baby. Some pain is part of the normal healing process. If you are able to avoid narcotic medications or take minimal narcotic medications for your pain, your recovery, as well as breastfeeding, will be easier. When needed, try taking only one tablet of the narcotic medication to see what the side effect is before taking two pills. Each person responds differently to narcotics. If you are taking the scheduled NSAID, you may not need as much of the narcotic. NSAIDS are better at controlling the cause of the pain for an episiotomy and cramping.      The side effects of narcotic medications may include an upset stomach, limited appetite, dizziness, difficulty having a bowel movement, delayed reflexes, and drowsiness. These side effects can impact your ability to care for your baby. In addition, your baby can get some of your medication through your breastmilk, which can make the baby sleepier and cause poor feeding. It is important not to drive until you are no longer taking ANY narcotics. We hope you have found some comfort measures to use during your pregnancy and recovery. However, many of these suggestions can be used in all parts of your life. Please make sure to discuss any pain or discomfort you have with your provider. Taking Care of [address] and Gynecology    Before the baby is born, find a doctor who will take care of your baby. You may use a family doctor or pediatrician, (Not your OB doctor). Call the offices to ask if they are accepting new patients and ask if they will accept your  after delivery. Having a baby doctor prior to coming to the hospital is important. [facility] has In-House Pediatricians to care for your infant if needed. They will only care for your baby during your stay in the hospital. We will then send all the baby's information to the physician you have chosen so you need one prior to discharge. If you need a list of doctors or pediatricians who may be accepting new patients, contact [facility] at [phone]. Around 30 weeks gestation call your insurance company to let them know you are expecting a baby and when your due date will be. Also Call your insurance company within 30 days after you deliver the baby to add him/her to your insurance plan.     Pre-[place] by going to our website [email or web address]  Hover over the Patient Resources  Click on [employer] Pre-Registration  Click on clue hyperlink Complete your pre-registration  Accept the disclaimer after reviewing  [address] out the information completely. If they ask for Date of Service, put your Due Date in unless you have a surgery or induction date set. Click the submit button.     Sign up for Prenatal Classes by calling [phone]         What to [place] and Gynecology  Packing for Labor    Birth Plan  Robe and slippers for walking  Focal point - picture, etc.  Playlist on phone/tablet/iPod for relaxing  Lotion and other items for back massage (tennis ball in long sock)  Chapstick/lip gloss  Mouthwash, toothbrush and toothpaste  Entertainment - books, cards, magazine  Nutritious snack for partners  Small change and money for snack machine  Contact lens case, solution, eyeglasses and case  Warm socks  List of phone numbers and email to notify people at birth  Hair ties  Camera or video camera with extra batteries and film  Extra pillows for comfort with colored pillowcases  Anything else you may find useful during your stay at [address] [place] [address] at home   Packing for Postpartum    Two or three nightgowns/pajamas  Robe  Slippers  Two or three supportive bras  Hair care items  Cosmetics  Comfortable clothes (early pregnancy size with loose waistband)  Outfit for oscar to go home in and get his/her pictures taken in  Baby book (for footprints)  Toiletry items needed (hospital provides pads and disposable underwear)  Extra reading, entertaining items you feel like you will need  Baby name book  Infant car seat - make sure to read the instructions and become familiar with how to install and adjust car seat    If you are planning to Breastfeed:  Nursing bras (you will probably need a full cup larger than you wore prior to becoming pregnant)  Nursing pads  A good breastfeeding book  Nursing gown, but the hospital provides them during your stay

## 2022-10-17 NOTE — PROGRESS NOTES
Prenatal Visit    Nanda Gallo is a 25 y.o. female  at 8w3d    Subjective: The patient was seen and evaluated. She denies any complaints. She denies contractions, vaginal bleeding and leakage of fluid. Dates were reviewed with the patient. Estimated Date of Delivery: 23. Had laparoscopic myomectomy to remove 8cm uterine fibroid w/ extension into the submucosa in  at [facility]. A single fundal incision 2/3 length of uterus was made and extended into the endometrium. Patient reports to mild cramping at night and improves with using a pillow under her back. She reports that her nausea is well controlled with preggy pops consisting of pyridoxine. She continues to try to cut down on the marijuana usage. She continues to take prenatal vitamins. She has an appointment with [facility]. She comes to the appointment today with her fiance. VITALS:  BP: 122/70  Weight: 120 lb 6.4 oz (54.6 kg)  Heart Rate: 87  Albumin: Negative  Glucose: Negative     PRENATAL LAB RESULTS:   Blood Type/Rh:  O+  Antibody Screen:    Antibody Screen   Date Value Ref Range Status   10/05/2022 NEG  Final     Comment:     Performed at [facility], [address]     Hemoglobin:   Hemoglobin   Date Value Ref Range Status   2022 13.8 12.0 - 16.0 gm/dl Final     Hematocrit:   Hematocrit   Date Value Ref Range Status   2022 42.4 37.0 - 47.0 % Final     Platelet Count:   Platelets   Date Value Ref Range Status   2022 226 130 - 400 thou/mm3 Final     Rubella:    Rubella Antibody, IGG   Date Value Ref Range Status   10/05/2022 37.4 IU/mL Final     Comment:     INTERPRETIVE INFORMATION: Rubella Ab, IgG    Less than 9 IU/mL . ....... Not Detected    9 - 9.9 IU/mL . ........... Indeterminate-Repeat testing in                               10-14 days may be helpful. 10 IU/mL or Greater . .. Loreatha Press Loreatha Press Loreatha Press  Detected  The best evidence for current infection is a significant change on  two appropriately timed specimens, where both tests are done in  the same laboratory at the same time. The magnitude of the measured result is not indicative of the  amount of antibody present. Performed By: Oscar Weathers [address]  [place], [address] [facility]  : Shavonne Robbins MD, PhD         Blood Type/Rh: O pos  Antibody Screen: negative  T. Pallidum, IgG: negative   Hepatitis B Surface Antigen: non-reactive   HIV: non-reactive   Sickle Cell Screen: not done  Gonorrhea: negative   Chlamydia: negative  Pap: abnormal - LGSIL; Low Grade Intraepithelial Lesion  UA : negative  First Trimester Screen:  Planned  MSAFP/Multiple Markers:  Planned  Anatomy US: Planned    Assessment & Plan:  Rachel Call is a 25 y.o. female  at [address] u/a was unremarkable in office   - LGSIL on pap smear w/ negative HPV, repeat in 1 year. - Will get genetic screening at next visit w/ [id number].  - An 18-22 week anatomy ultrasound is planned   - Quad Screen is planned for a 15-20 week gestational age window. - Patient will be seen until 28 weeks at [facility], at which point patient will transfer to an outside ob provider of her choice given a need for C/S delivery. Patient Active Problem List    Diagnosis Date Noted    Menorrhagia with regular cycle     Low TSH level      Return in about 4 weeks (around 2022).     Jean Asif MD  Family Medicine Resident  [facility], [address] [facility]  10/17/2022, 6:12 PM

## 2022-11-14 ENCOUNTER — ROUTINE PRENATAL (OUTPATIENT)
Dept: OBGYN CLINIC | Age: 22
End: 2022-11-14

## 2022-11-14 VITALS
RESPIRATION RATE: 20 BRPM | TEMPERATURE: 97.5 F | WEIGHT: 118.2 LBS | HEART RATE: 113 BPM | SYSTOLIC BLOOD PRESSURE: 122 MMHG | HEIGHT: 62 IN | OXYGEN SATURATION: 98 % | DIASTOLIC BLOOD PRESSURE: 70 MMHG | BODY MASS INDEX: 21.75 KG/M2

## 2022-11-14 DIAGNOSIS — Z3A.12 12 WEEKS GESTATION OF PREGNANCY: Primary | ICD-10-CM

## 2022-11-14 DIAGNOSIS — L30.9 ECZEMA, UNSPECIFIED TYPE: ICD-10-CM

## 2022-11-14 LAB
BACTERIA: ABNORMAL /HPF
BILIRUBIN URINE: NEGATIVE
BLOOD URINE, POC: NEGATIVE
BLOOD URINE, POC: NEGATIVE
BLOOD, URINE: NEGATIVE
CASTS 2: PRESENT /LPF
CASTS UA: ABNORMAL /LPF
CHARACTER, URINE: ABNORMAL
CHARACTER, URINE: CLEAR
CHARACTER, URINE: CLEAR
COLOR, URINE: YELLOW
COLOR, URINE: YELLOW
COLOR: YELLOW
CRYSTALS, UA: ABNORMAL
EPITHELIAL CELLS, UA: ABNORMAL /HPF
GLUCOSE URINE: NEGATIVE MG/DL
KETONES, URINE: >= 160
KETONES, URINE: >= 160
KETONES, URINE: NEGATIVE
LEUKOCYTE CLUMPS, URINE: ABNORMAL
LEUKOCYTE CLUMPS, URINE: NEGATIVE
LEUKOCYTE ESTERASE, URINE: ABNORMAL
MISCELLANEOUS 2: ABNORMAL
NITRITE, URINE: NEGATIVE
PH UA: 6 (ref 5–9)
PH, URINE: 6 (ref 5–9)
PH, URINE: 6 (ref 5–9)
PROTEIN UA: ABNORMAL
PROTEIN, URINE: 30 MG/DL
PROTEIN, URINE: 30 MG/DL
RBC URINE: ABNORMAL /HPF
RENAL EPITHELIAL, UA: ABNORMAL
SPECIFIC GRAVITY, URINE: 1.03 (ref 1–1.03)
SPECIFIC GRAVITY, URINE: >= 1.03 (ref 1–1.03)
SPECIFIC GRAVITY, URINE: >= 1.03 (ref 1–1.03)
UROBILINOGEN, URINE: 0.2 EU/DL (ref 0–1)
WBC UA: ABNORMAL /HPF
YEAST: ABNORMAL

## 2022-11-14 PROCEDURE — 99214 OFFICE O/P EST MOD 30 MIN: CPT | Performed by: OBSTETRICS & GYNECOLOGY

## 2022-11-14 RX ORDER — PNV NO.95/FERROUS FUM/FOLIC AC 28MG-0.8MG
1 TABLET ORAL DAILY
Qty: 30 TABLET | Refills: 8 | Status: SHIPPED | OUTPATIENT
Start: 2022-11-14

## 2022-11-14 NOTE — PROGRESS NOTES
Prenatal Visit    Bard Medley is a 25 y.o. female  at 13w0d    Subjective: The patient was seen and evaluated. She denies contractions, vaginal bleeding and leakage of fluid. Dates were reviewed with the patient. Estimated Date of Delivery: 23           Patient still planning on  delivery due to previous myomectomy in  for 8 cm uterine fibroid. She states that she has had improved nausea symptoms. No vomiting. She continues to take prenatal vitamins. She has stated that she has cut down her marijuana use from 6 times daily to once every few weeks. She comes in with her fiancé today. Noted that patient has had an eczema breakout on her right eye with pruritis. Had Ina Dirk previously as a child. Also noted to have dry skin around face which she states is due to the winter time. Has tried steroid cream before which helped as a child. VITALS:  BP: 122/70  Weight: 118 lb 3.2 oz (53.6 kg)  Heart Rate: (!) 113  Albumin: 1+  Glucose: Negative   Recheck manual HR for patient: 84  FHTs 140    PRENATAL LAB RESULTS:   Blood Type/Rh:  No results found for: ABORH  Antibody Screen:    Antibody Screen   Date Value Ref Range Status   10/05/2022 NEG  Final     Comment:     Performed at [facility], [address]     Hemoglobin:   Hemoglobin   Date Value Ref Range Status   2022 13.8 12.0 - 16.0 gm/dl Final     Hematocrit:   Hematocrit   Date Value Ref Range Status   2022 42.4 37.0 - 47.0 % Final     Platelet Count:   Platelets   Date Value Ref Range Status   2022 226 130 - 400 thou/mm3 Final     Rubella:    Rubella Antibody, IgG   Date Value Ref Range Status   10/05/2022 37.4 IU/mL Final     Comment:     INTERPRETIVE INFORMATION: Rubella Ab, IgG    Less than 9 IU/mL . ....... Not Detected    9 - 9.9 IU/mL . ........... Indeterminate-Repeat testing in                               10-14 days may be helpful. 10 IU/mL or Greater . .. Lucyann Push Lucyann Push Lucyann Push  Detected  The best evidence for current infection is a significant change on  two appropriately timed specimens, where both tests are done in  the same laboratory at the same time. The magnitude of the measured result is not indicative of the  amount of antibody present. Performed By: [address]  [place], [address] [facility]  : Jo-Ann Leblanc MD, PhD       T. Pallidium, IGG:  No results found for: TREPG  Sickle Cell Screen: No results found for: SICKLE  Hepatitis B Surface Antigen: No results found for: HEPBSAG  HIV:  No results found for: TBT85FS    Blood Type/Rh: O pos  Antibody Screen: negative  Rubella: immune  Hepatitis B Surface Antigen: negative   HIV: non-reactive   Sickle Cell Screen: not done  Gonorrhea: negative   Chlamydia: negative  Pap: abnormal - LGSIL; Low Grade Intraepithelial Lesion, repeat in 1 year  UA w/ Urine C&S: negative  First Trimester Screen:  ordered, low risk  Anatomy US: To be completed around 20 weeks. Assessment & Plan:  Jona Quijano is a 25 y.o. female  at 13w0d   - Will refill pre- vitamins    - Will send POCT urine for culture 2/2 protein   - Continue to abstain from using marijuana. - Anatomy Scan planned at 18-22 weeks   - Will get [id number] testing   - Recommend Vaseline for eczema breakout on eye.     - Quad Screen planned for 15-20 week gestational age window   -Patient will be seen in our clinic until 28 weeks at which point she will be transferred to outside [facility] provider for  delivery        Patient Active Problem List    Diagnosis Date Noted    History of myomectomy 10/17/2022     Had laparoscopic myomectomy to remove 8cm uterine fibroid w/ extension into the submucosa in  at [facility]. A single fundal incision 2/3 length of uterus was made and extended into the endometrium. Menorrhagia with regular cycle     Low TSH level      Return in about 4 weeks (around 2022).     Valerie Duval Shayy Tinoco DO  Family Medicine Resident  [facility], [address]  11/14/2022, 9:35 PM

## 2022-11-16 LAB
ORGANISM: ABNORMAL
URINE CULTURE REFLEX: ABNORMAL

## 2022-11-18 ENCOUNTER — TELEPHONE (OUTPATIENT)
Dept: OBGYN CLINIC | Age: 22
End: 2022-11-18

## 2022-11-19 NOTE — TELEPHONE ENCOUNTER
Dr. Davie Vera received call this afternoon regarding patient having continue eye rash despite emolient use. Attempted to return call x4 with call going to voicemail.

## 2022-11-21 ENCOUNTER — TELEPHONE (OUTPATIENT)
Dept: FAMILY MEDICINE CLINIC | Age: 22
End: 2022-11-21

## 2022-11-21 ENCOUNTER — TELEPHONE (OUTPATIENT)
Dept: OBGYN CLINIC | Age: 22
End: 2022-11-21

## 2022-11-21 DIAGNOSIS — L30.9 ECZEMA, UNSPECIFIED TYPE: Primary | ICD-10-CM

## 2022-11-21 RX ORDER — FLUOROMETHOLONE 1 MG/G
OINTMENT OPHTHALMIC 4 TIMES DAILY
Qty: 3.5 G | Refills: 0 | Status: SHIPPED | OUTPATIENT
Start: 2022-11-21 | End: 2022-11-22 | Stop reason: SDUPTHER

## 2022-11-21 NOTE — TELEPHONE ENCOUNTER
Called patient on 11/21 at 5:24 PM to let her know that I had called in a prescription for fluorometholone ophthalmic ointment for her eye issue. I instructed her to let us know if that improves or worsens. Patient was agreeable and had no questions at this time.     Electronically signed by Danielito Mendez MD on 11/21/22 at 5:25 PM EST

## 2022-11-21 NOTE — TELEPHONE ENCOUNTER
Pt. Returned call to office in regards to her eye rash. Pt. Explained that Dr. Rosa Bae advised her to call back the office if eye rash is worse and she would get something else to help with it. Pt. Left eye is now heavy, she had been using vaseline but then was told to stop the vaseline and use benadryl. Her eye is worse, brighter and constantly itchy, and still looks like she has a black eye. Pt. Would like a call back, she has lunch 11:30-12:00 and is available after work at 3:30. Please advise.

## 2022-11-22 DIAGNOSIS — L30.9 ECZEMA, UNSPECIFIED TYPE: ICD-10-CM

## 2022-11-22 RX ORDER — FLUOROMETHOLONE 1 MG/G
OINTMENT OPHTHALMIC 4 TIMES DAILY
Qty: 3.5 G | Refills: 0 | Status: SHIPPED | OUTPATIENT
Start: 2022-11-22

## 2022-12-12 ENCOUNTER — ROUTINE PRENATAL (OUTPATIENT)
Dept: OBGYN CLINIC | Age: 22
End: 2022-12-12

## 2022-12-12 VITALS
TEMPERATURE: 97.2 F | DIASTOLIC BLOOD PRESSURE: 60 MMHG | OXYGEN SATURATION: 99 % | BODY MASS INDEX: 23 KG/M2 | WEIGHT: 125 LBS | HEIGHT: 62 IN | RESPIRATION RATE: 20 BRPM | SYSTOLIC BLOOD PRESSURE: 102 MMHG | HEART RATE: 91 BPM

## 2022-12-12 DIAGNOSIS — Z34.02 ENCOUNTER FOR SUPERVISION OF NORMAL FIRST PREGNANCY IN SECOND TRIMESTER: Primary | ICD-10-CM

## 2022-12-12 PROBLEM — R79.89 LOW TSH LEVEL: Status: ACTIVE | Noted: 2020-01-02

## 2022-12-12 PROBLEM — D21.9 FIBROIDS: Status: ACTIVE | Noted: 2019-11-13

## 2022-12-12 PROBLEM — N92.0 MENORRHAGIA WITH REGULAR CYCLE: Status: ACTIVE | Noted: 2020-01-02

## 2022-12-12 LAB
BILIRUBIN URINE: NEGATIVE
BLOOD URINE, POC: ABNORMAL
CHARACTER, URINE: CLEAR
COLOR, URINE: YELLOW
GLUCOSE URINE: NEGATIVE MG/DL
KETONES, URINE: NEGATIVE
LEUKOCYTE CLUMPS, URINE: ABNORMAL
NITRITE, URINE: NEGATIVE
PH, URINE: 6 (ref 5–9)
PROTEIN, URINE: 30 MG/DL
SPECIFIC GRAVITY, URINE: >= 1.03 (ref 1–1.03)
UROBILINOGEN, URINE: 0.2 EU/DL (ref 0–1)

## 2022-12-12 NOTE — LETTER
[address] [facility]  [address] [place] [address]  Phone: [phone]  Fax: [phone]    Jb Luz MD        December 12, 2022     Patient: Kristan Ortiz   YOB: 2000   Date of Visit: 12/12/2022       To Whom It May Concern: It is my medical opinion that Remi Castellanos was seen in our clinic on 12/12/2022. If you have any questions or concerns, please don't hesitate to call.     Sincerely,        Jb Luz MD

## 2022-12-12 NOTE — PROGRESS NOTES
Prenatal Visit    Chalo Reyes is a 25 y.o. female  at 16w0d    Subjective: The patient was seen and evaluated. She  denies contractions, vaginal bleeding and leakage of fluid. Dates were reviewed with the patient. Estimated Date of Delivery: 23           Patient still planning on  delivery due to previous myomectomy in  for 8 cm uterine fibroid. She is no longer having nausea. No vomiting. She continues to take prenatal vitamins. She states that she has smoked her last blunt today. She has been cutting down throughout pregnancy. She comes in with her fiancé today. Patient did go to urgent care for eczema breakout on her eye after not being able to get prescription cream. They recommended an over the counter cream which is helping at this time. VITALS:  BP: 102/60  Weight: 125 lb (56.7 kg)  Heart Rate: 91  Albumin: 1+  Glucose: Negative  Fundal Height (cm): 16 cm  Fetal HR: 147   Vitals:    22 1444   BP: 102/60   Site: Left Upper Arm   Position: Sitting   Cuff Size: Medium Adult   Pulse: 91   Resp: 20   Temp: 97.2 °F (36.2 °C)   TempSrc: Skin   SpO2: 99%   Weight: 125 lb (56.7 kg)   Height: 5' 2\" (1.575 m)         PRENATAL LAB RESULTS:   Blood Type/Rh: O POS  Antibody Screen:    Antibody Screen   Date Value Ref Range Status   10/05/2022 NEG  Final     Comment:     Performed at [address] [place] [address], [address]     Hemoglobin:   Hemoglobin   Date Value Ref Range Status   2022 13.8 12.0 - 16.0 gm/dl Final     Hematocrit:   Hematocrit   Date Value Ref Range Status   2022 42.4 37.0 - 47.0 % Final     Platelet Count:   Platelets   Date Value Ref Range Status   2022 226 130 - 400 thou/mm3 Final     Rubella:    Rubella Antibody, IgG   Date Value Ref Range Status   10/05/2022 37.4 IU/mL Final     Comment:     INTERPRETIVE INFORMATION: Rubella Ab, IgG    Less than 9 IU/mL . ....... Not Detected    9 - 9.9 IU/mL . .......... Curry Valdez Indeterminate-Repeat testing in                               10-14 days may be helpful. 10 IU/mL or Greater . .. Awilda Balling Awilda Balling Awilda Balling Detected  The best evidence for current infection is a significant change on  two appropriately timed specimens, where both tests are done in  the same laboratory at the same time. The magnitude of the measured result is not indicative of the  amount of antibody present. Performed By: [address]  [place], [facility]  : Jory Hull MD, PhD       T. Pallidium, IGG:  nonreactive  Sickle Cell Screen: No results found for: SICKLE  Hepatitis B Surface Antigen: No results found for: HEPBSAG  HIV:  nonreactive    Blood Type/Rh: O pos  Antibody Screen: negative  Rubella: immune  Hepatitis B Surface Antigen: negative   HIV: non-reactive   Sickle Cell Screen: not done  Gonorrhea: negative   Chlamydia: negative  Pap: abnormal - LGSIL; Low Grade Intraepithelial Lesion, repeat in 1 year  UA w/ Urine C&S: negative  First Trimester Screen:  ordered, low risk  Anatomy US: To be completed around 20 weeks. Assessment & Plan:  Neha Smith is a 25 y.o. female  at 13w0d   - recommended continued pre- vitamins   - Continue to abstain from using marijuana. - Anatomy Scan planned at 18-22 weeks   - Will not get [id number] testing (cost and patient wants to know gender but would not do anything with the results otherwise)   - continue OTC cream for eczema, contact office if worsens/does not improve   -Patient will be seen in our clinic until 28 weeks at which point she will be transferred to outside [facility] provider for  delivery        Patient Active Problem List    Diagnosis Date Noted    Fibroids 2019     Priority: Medium     Formatting of this note might be different from the original.  2019: Pt presents to discuss hx of fibroids. [facility] her on Provera (10MG) which has helped her issues.  Pt notes she would like to get these issues taken care of before she begins college. Pt is a , constantly moving, lifting heavy equipment. 1/13/2020: Pt presents for preoperative evaluation. Last Assessment & Plan:   Formatting of this note might be different from the original.  Tim Kauffman is a 23 y.o. female with a history of  a large uterine fibroid who is POD#1  from robotic assisted myomectomy and chromopertubation.    - HD stable; recent Hgb 11.4 >  > post-op Hgb 8.9   - Pain: controlled with ERAS protocol  - MIVF/PO fluids: MIVF at 125 mL/hr  - GI: emigdio reg diet  - : Voiding sontaneously. Uterine richter balloon in place. Patient to d/c home with balloon and follow up in office.  - ID: s/p pre-op antibiotic  - Resp: Incentive spirometry use   - DVT Prophylaxis: SCDs and early ambulation  - Dispo: Routine post-op care      History of myomectomy 10/17/2022     Had laparoscopic myomectomy to remove 8cm uterine fibroid w/ extension into the submucosa in 2020 at [facility]. A single fundal incision 2/3 length of uterus was made and extended into the endometrium. Menorrhagia with regular cycle 01/02/2020    Low TSH level 01/02/2020     Return in about 4 weeks (around 1/9/2023).     Christian Chase MD  [facility] Medicine Resident  [place], [place]  12/12/2022, 3:58 PM

## 2023-01-09 ENCOUNTER — ROUTINE PRENATAL (OUTPATIENT)
Dept: OBGYN CLINIC | Age: 23
End: 2023-01-09

## 2023-01-09 VITALS
SYSTOLIC BLOOD PRESSURE: 116 MMHG | HEART RATE: 98 BPM | TEMPERATURE: 97.3 F | DIASTOLIC BLOOD PRESSURE: 72 MMHG | RESPIRATION RATE: 16 BRPM | BODY MASS INDEX: 23.08 KG/M2 | HEIGHT: 62 IN | OXYGEN SATURATION: 98 % | WEIGHT: 125.4 LBS

## 2023-01-09 DIAGNOSIS — Z3A.20 20 WEEKS GESTATION OF PREGNANCY: Primary | ICD-10-CM

## 2023-01-09 DIAGNOSIS — R82.71 BACTERIURIA DURING PREGNANCY: ICD-10-CM

## 2023-01-09 DIAGNOSIS — O99.891 BACTERIURIA DURING PREGNANCY: ICD-10-CM

## 2023-01-09 DIAGNOSIS — L30.8 OTHER ECZEMA: ICD-10-CM

## 2023-01-09 DIAGNOSIS — D64.9 ANEMIA, UNSPECIFIED TYPE: ICD-10-CM

## 2023-01-09 LAB
BILIRUBIN URINE: ABNORMAL
BLOOD URINE, POC: NEGATIVE
CHARACTER, URINE: CLEAR
COLOR, URINE: YELLOW
GLUCOSE URINE: NEGATIVE MG/DL
KETONES, URINE: >= 160
LEUKOCYTE CLUMPS, URINE: ABNORMAL
NITRITE, URINE: NEGATIVE
PH, URINE: 6 (ref 5–9)
PROTEIN, URINE: 30 MG/DL
SPECIFIC GRAVITY, URINE: >= 1.03 (ref 1–1.03)
UROBILINOGEN, URINE: 0.2 EU/DL (ref 0–1)

## 2023-01-09 PROCEDURE — 0502F SUBSEQUENT PRENATAL CARE: CPT | Performed by: OBSTETRICS & GYNECOLOGY

## 2023-01-09 RX ORDER — PIMECROLIMUS 10 MG/G
CREAM TOPICAL
Qty: 1 EACH | Refills: 3 | Status: SHIPPED | OUTPATIENT
Start: 2023-01-09

## 2023-01-09 ASSESSMENT — PATIENT HEALTH QUESTIONNAIRE - PHQ9
SUM OF ALL RESPONSES TO PHQ9 QUESTIONS 1 & 2: 0
SUM OF ALL RESPONSES TO PHQ QUESTIONS 1-9: 0
SUM OF ALL RESPONSES TO PHQ QUESTIONS 1-9: 0
2. FEELING DOWN, DEPRESSED OR HOPELESS: 0
SUM OF ALL RESPONSES TO PHQ QUESTIONS 1-9: 0
SUM OF ALL RESPONSES TO PHQ QUESTIONS 1-9: 0
1. LITTLE INTEREST OR PLEASURE IN DOING THINGS: 0

## 2023-01-09 NOTE — PROGRESS NOTES
Prenatal Visit    Phoebe Mora is a 25 y.o. female  at 24w0d    The patient was seen and evaluated. She complains of worsening eczema. There was positive fetal movements. She denies contractions, vaginal bleeding and leakage of fluid. Signs and symptoms of  labor as well as labor were reviewed. The S/S of Pre-Eclampsia were reviewed with the patient in detail. She is to report any of these if they occur. She currently denies any of these. Has been struggling with eczema on her face, neck arms. Has been using aquaphor and it got better initially but now worse. The patient declined the influenza vaccine this year. Pt declined COVID vaccine. The problem list reflects the active issues addressed during today's visit    Had Anatomy scan today wnl. Placenta posterior, normal AF with 3 vessel cord, cx length 35 mm. Baby breech. Female. See media tab for full report. Vitals:  Vitals:    23 1424   BP: 116/72   Site: Left Upper Arm   Position: Sitting   Cuff Size: Medium Adult   Pulse: 98   Resp: 16   Temp: 97.3 °F (36.3 °C)   TempSrc: Skin   SpO2: 98%   Weight: 125 lb 6.4 oz (56.9 kg)   Height: 5' 2\" (1.575 m)      BP: 116/72  Weight: 125 lb 6.4 oz (56.9 kg)  Heart Rate: 98  Albumin: 1+  Glucose: Negative   Fundal height 20,     Labs:   The patient is RH -, Rhogam not indicated  No results found for: ABORH    1 hour GTT test is Not yet done    Assessment & Plan:  Phoebe Mora is a 25 y.o. female  at 24w0d   - 1Hour gtt at next visit   - The patients anatomy ultrasound has been completed and was reviewed with patient     Eczema   - elidel topical prescribed      Asymptomatic bacteriuria  - Had bacteria in urine with micro 2022  - today UA had protein, leukocytes but no nitrates   - culture ordered    High risk dt history of myomectomy will need CS; will transition to outside OBGYNclinic at 28 wks    Patient Active Problem List    Diagnosis Date Noted    Fibroids 2019 Priority: Medium     Formatting of this note might be different from the original.  11/13/2019: Pt presents to discuss hx of fibroids. [facility] Provera (10MG) which has helped her issues. Pt notes she would like to get these issues taken care of before she begins college. Pt is a , constantly moving, lifting heavy equipment. 1/13/2020: Pt presents for preoperative evaluation. Last Assessment & Plan:   Formatting of this note might be different from the original.  Yasmine Livingston is a 23 y.o. female with a history of  a large uterine fibroid who is POD#1  from robotic assisted myomectomy and chromopertubation.    - HD stable; recent Hgb 11.4 >  > post-op Hgb 8.9   - Pain: controlled with ERAS protocol  - MIVF/PO fluids: MIVF at 125 mL/hr  - GI: emigdio reg diet  - : Voiding sontaneously. Uterine richter balloon in place. Patient to d/c home with balloon and follow up in office.  - ID: s/p pre-op antibiotic  - Resp: Incentive spirometry use   - DVT Prophylaxis: SCDs and early ambulation  - Dispo: Routine post-op care      History of myomectomy 10/17/2022     Had laparoscopic myomectomy to remove 8cm uterine fibroid w/ extension into the submucosa in 2020 at [facility]. A single fundal incision 2/3 length of uterus was made and extended into the endometrium. Menorrhagia with regular cycle 01/02/2020    Low TSH level 01/02/2020     Return in about 4 weeks (around 2/6/2023) for 24 wk visit.       Avery Mail, DO  [facility] Family Medicine Resident  1/9/2023, 9:24 PM

## 2023-01-10 LAB
ORGANISM: ABNORMAL
URINE CULTURE, ROUTINE: ABNORMAL

## 2023-01-16 ENCOUNTER — TELEPHONE (OUTPATIENT)
Dept: OBGYN CLINIC | Age: 23
End: 2023-01-16

## 2023-01-16 ENCOUNTER — TELEPHONE (OUTPATIENT)
Dept: FAMILY MEDICINE CLINIC | Age: 23
End: 2023-01-16

## 2023-01-16 DIAGNOSIS — L30.9 ECZEMA, UNSPECIFIED TYPE: Primary | ICD-10-CM

## 2023-01-16 RX ORDER — PIMECROLIMUS 10 MG/G
CREAM TOPICAL
Qty: 30 G | Refills: 1 | Status: SHIPPED | OUTPATIENT
Start: 2023-01-16

## 2023-01-16 NOTE — TELEPHONE ENCOUNTER
----- Message from Claudia Argueta DO sent at 1/14/2023  7:09 PM EST -----  Please let the patient know her urine culture came back negative and she does not to take any antibiotics. We will see her at her follow up appointment 2/6. Thanks!

## 2023-01-16 NOTE — TELEPHONE ENCOUNTER
Pt called stating the elidel that was prescribed last week at her obov costs $287 after ins  what else could she try?

## 2023-01-17 NOTE — TELEPHONE ENCOUNTER
The only program available is for patients without insurance or Medicare D, I couldn't locate a copay card either. It might be high because of a deductible maybe.

## 2023-02-06 ENCOUNTER — ROUTINE PRENATAL (OUTPATIENT)
Dept: OBGYN CLINIC | Age: 23
End: 2023-02-06
Payer: COMMERCIAL

## 2023-02-06 VITALS
SYSTOLIC BLOOD PRESSURE: 120 MMHG | HEIGHT: 62 IN | BODY MASS INDEX: 24.62 KG/M2 | TEMPERATURE: 97.7 F | OXYGEN SATURATION: 99 % | DIASTOLIC BLOOD PRESSURE: 66 MMHG | HEART RATE: 84 BPM | WEIGHT: 133.8 LBS | RESPIRATION RATE: 20 BRPM

## 2023-02-06 DIAGNOSIS — Z3A.24 24 WEEKS GESTATION OF PREGNANCY: Primary | ICD-10-CM

## 2023-02-06 DIAGNOSIS — L30.9 ECZEMA, UNSPECIFIED TYPE: ICD-10-CM

## 2023-02-06 LAB
BILIRUBIN URINE: NEGATIVE
BLOOD URINE, POC: NEGATIVE
CHARACTER, URINE: CLEAR
COLOR, URINE: YELLOW
GLUCOSE URINE: NEGATIVE MG/DL
KETONES, URINE: 80
LEUKOCYTE CLUMPS, URINE: ABNORMAL
NITRITE, URINE: NEGATIVE
PH, URINE: 6.5 (ref 5–9)
PROTEIN, URINE: ABNORMAL MG/DL
SPECIFIC GRAVITY, URINE: 1.02 (ref 1–1.03)
UROBILINOGEN, URINE: 0.2 EU/DL (ref 0–1)

## 2023-02-06 PROCEDURE — 99214 OFFICE O/P EST MOD 30 MIN: CPT | Performed by: OBSTETRICS & GYNECOLOGY

## 2023-02-06 SDOH — ECONOMIC STABILITY: INCOME INSECURITY: HOW HARD IS IT FOR YOU TO PAY FOR THE VERY BASICS LIKE FOOD, HOUSING, MEDICAL CARE, AND HEATING?: SOMEWHAT HARD

## 2023-02-06 SDOH — ECONOMIC STABILITY: FOOD INSECURITY: WITHIN THE PAST 12 MONTHS, THE FOOD YOU BOUGHT JUST DIDN'T LAST AND YOU DIDN'T HAVE MONEY TO GET MORE.: SOMETIMES TRUE

## 2023-02-06 SDOH — ECONOMIC STABILITY: FOOD INSECURITY: WITHIN THE PAST 12 MONTHS, YOU WORRIED THAT YOUR FOOD WOULD RUN OUT BEFORE YOU GOT MONEY TO BUY MORE.: SOMETIMES TRUE

## 2023-02-06 SDOH — ECONOMIC STABILITY: HOUSING INSECURITY
IN THE LAST 12 MONTHS, WAS THERE A TIME WHEN YOU DID NOT HAVE A STEADY PLACE TO SLEEP OR SLEPT IN A SHELTER (INCLUDING NOW)?: NO

## 2023-02-06 NOTE — PROGRESS NOTES
Prenatal Visit    Danielle Waller is a 25 y.o. female  at 18w0d    The patient was seen and evaluated. History of a large uterine fibroid from robotic assisted myomectomy and chromopertubation. She has no concerns. There was positive fetal movements. She denies contractions, vaginal bleeding and leakage of fluid. Signs and symptoms of  labor as well as labor were reviewed. The S/S of Pre-Eclampsia were reviewed with the patient in detail. She is to report any of these if they occur. She currently denies any of these. The patient declined the influenza vaccine this year. Pt declined COVID vaccine. The problem list reflects the active issues addressed during today's visit    High risk dt history of myomectomy will need CS; will transition to outside OBGYN clinic at 28 wks    States eczema is going away. Not using anything for it. Last cream she was prescribed was too expensive. By the time she was able to get generic version, it started clearing up. Does not have any concerns regarding eczema at this time. Fundal height 25 cm      Vitals:  Vitals:    23 1552   BP: 120/66   Pulse: 84   Resp: 20   Temp: 97.7 °F (36.5 °C)   SpO2: 99%            Labs: The patient is RH -, Rhogam not indicated today    No results found for: ABORH    1 hour GTT test is next visit    Assessment & Plan:  Danielle Waller is a 25 y.o. female  at 18w0d     Eczema  Need for     - Patient will start following with Dr. Wolfgang Fernando OB group after 28th week gestation  - Plan for  due to Hx myomectomy and chromopertubation     Patient Active Problem List    Diagnosis Date Noted    Fibroids 2019     Priority: Medium     Formatting of this note might be different from the original.  2019: Pt presents to discuss hx of fibroids. Dr. Eric Melendez from [facility] placed her on Provera (10MG) which has helped her issues. Pt notes she would like to get these issues taken care of before she begins college.  Pt is a , constantly moving, lifting heavy equipment. 2020: Pt presents for preoperative evaluation. Last Assessment & Plan:   Formatting of this note might be different from the original.  Jones Alvarez is a 23 y.o. female with a history of  a large uterine fibroid who is POD#1  from robotic assisted myomectomy and chromopertubation.    - HD stable; recent Hgb 11.4 >  > post-op Hgb 8.9   - Pain: controlled with ERAS protocol  - MIVF/PO fluids: MIVF at 125 mL/hr  - GI: emigdio reg diet  - : Voiding sontaneously. Uterine richter balloon in place. Patient to d/c home with balloon and follow up in office.  - ID: s/p pre-op antibiotic  - Resp: Incentive spirometry use   - DVT Prophylaxis: SCDs and early ambulation  - Dispo: Routine post-op care      History of myomectomy 10/17/2022     Had laparoscopic myomectomy to remove 8cm uterine fibroid w/ extension into the submucosa in  at [facility]. A single fundal incision 2/3 length of uterus was made and extended into the endometrium. Menorrhagia with regular cycle 2020    Low TSH level 2020     Follow up in 4 weeks. The patient was counseled on Labor & Delivery. Route of delivery and counseling on vaginal, operative vaginal, and  sections were completed with the risks of each to both the patient as well as her baby. The possibility of a blood transfusion was discussed as well. The patient was not opposed to receiving a transfusion if needed.  The patient was counseled on types of analgesia during labor and is considering epidural.      Callum Ross DO  [facility] Family Medicine Resident  2023, 12:29 PM

## 2023-03-06 ENCOUNTER — ROUTINE PRENATAL (OUTPATIENT)
Dept: OBGYN CLINIC | Age: 23
End: 2023-03-06
Payer: COMMERCIAL

## 2023-03-06 VITALS
RESPIRATION RATE: 20 BRPM | DIASTOLIC BLOOD PRESSURE: 68 MMHG | BODY MASS INDEX: 26.39 KG/M2 | HEIGHT: 62 IN | TEMPERATURE: 98 F | WEIGHT: 143.4 LBS | HEART RATE: 95 BPM | SYSTOLIC BLOOD PRESSURE: 124 MMHG | OXYGEN SATURATION: 99 %

## 2023-03-06 DIAGNOSIS — O99.019 ANEMIA AFFECTING FIRST PREGNANCY: ICD-10-CM

## 2023-03-06 DIAGNOSIS — Z98.890 HISTORY OF MYOMECTOMY: ICD-10-CM

## 2023-03-06 DIAGNOSIS — Z3A.28 PREGNANCY WITH 28 COMPLETED WEEKS GESTATION: Primary | ICD-10-CM

## 2023-03-06 DIAGNOSIS — D21.9 FIBROIDS: ICD-10-CM

## 2023-03-06 LAB
BILIRUBIN URINE: NEGATIVE
BLOOD URINE, POC: NEGATIVE
CHARACTER, URINE: CLEAR
COLOR, URINE: YELLOW
GLUCOSE 1 HOUR: 89
GLUCOSE URINE: NEGATIVE MG/DL
HGB, POC: 8.9
KETONES, URINE: ABNORMAL
LEUKOCYTE CLUMPS, URINE: ABNORMAL
NITRITE, URINE: NEGATIVE
PH, URINE: 6.5 (ref 5–9)
PROTEIN, URINE: 30 MG/DL
SPECIFIC GRAVITY, URINE: 1.02 (ref 1–1.03)
UROBILINOGEN, URINE: 0.2 EU/DL (ref 0–1)

## 2023-03-06 PROCEDURE — 82950 GLUCOSE TEST: CPT | Performed by: OBSTETRICS & GYNECOLOGY

## 2023-03-06 PROCEDURE — 99902 PR PRENATAL VISIT: CPT | Performed by: OBSTETRICS & GYNECOLOGY

## 2023-03-06 PROCEDURE — 85018 HEMOGLOBIN: CPT | Performed by: OBSTETRICS & GYNECOLOGY

## 2023-03-06 NOTE — PROGRESS NOTES
Prenatal Visit    Stefania Veliz is a 25 y.o. female  at 31w0d    The patient was seen and evaluated. She complains of bilateral rib pain which is intermittent and associated with fetal movement. There was positive fetal movements. She denies contractions, vaginal bleeding and leakage of fluid. Signs and symptoms of  labor as well as labor were reviewed. The S/S of Pre-Eclampsia were reviewed with the patient in detail. She is to report any of these if they occur. She currently denies any of these. The patient was instructed to utilize the health department to receive the T-Dap Vaccine (27-36 weeks) this pregnancy. The patient declined the influenza vaccine this year. The problem list reflects the active issues addressed during today's visit  1. Pregnancy with 28 completed weeks gestation    2. Fibroids    3. History of myomectomy    4. Anemia affecting first pregnancy      Fetal Vitals:  Fetal Heart Tones: 142  Fetal movement: present  Fundal Height: 28 cm    Maternal Vitals:  Vitals:    23 1555   BP: 124/68   Pulse: 95   Resp: 20   Temp: 98 °F (36.7 °C)   SpO2: 99%      BP: 124/68  Weight: 143 lb 6.4 oz (65 kg)  Heart Rate: 95  Albumin: 2+  Glucose: Negative  Movement: Present     28 Week Labs:   The patient is RH positive, Rhogam not indicated  (O positive)    1hr GTT: completed 3/6/2023 -  89    28 week CBC:     UA: completed 3/6/2023    Urobilinogen, Urine      0.0 - 1.0 eu/dl 0.20   Nitrite, Urine      NEGATIVE Negative   Leukocyte Esterase, Urine      NEGATIVE    Color, UA      YELLOW-STRAW Yellow   Character, Urine      CLR-SL.CLOUD Clear     Glucose, UA      NEGATIVE mg/dl Negative   Specific Gravity, Urine      1.002 - 1.030 >= 1.030     Blood, UA POC      NEGATIVE Negative   pH, Urine      5.0 - 9.0 6.00   Protein, Urine      NEGATIVE mg/dl 30 (A)   Leukocyte Clumps, Urine      NEGATIVE Trace (A)     Assessment & Plan:  Stefania Veliz is a 25 y.o. female  at 31w0d   - 28 week labs ordered   - discussed recommendations for TDAP immunization, patient  instructed to utilize the health department to receive TDAP. Patient Active Problem List    Diagnosis Date Noted    Fibroids 2019     Priority: Medium     Formatting of this note might be different from the original.  2019: Pt presents to discuss hx of fibroids. Dr. Jamie Merino from [facility] placed her on Provera (10MG) which has helped her issues. Pt notes she would like to get these issues taken care of before she begins college. Pt is a , constantly moving, lifting heavy equipment. 2020: Pt presents for preoperative evaluation. Last Assessment & Plan:   Formatting of this note might be different from the original.  Paul England is a 23 y.o. female with a history of  a large uterine fibroid who is POD#1  from robotic assisted myomectomy and chromopertubation.    - HD stable; recent Hgb 11.4 >  > post-op Hgb 8.9   - Pain: controlled with ERAS protocol  - MIVF/PO fluids: MIVF at 125 mL/hr  - GI: emigdio reg diet  - : Voiding sontaneously. Uterine richter balloon in place. Patient to d/c home with balloon and follow up in office.  - ID: s/p pre-op antibiotic  - Resp: Incentive spirometry use   - DVT Prophylaxis: SCDs and early ambulation  - Dispo: Routine post-op care      History of myomectomy 10/17/2022     Had laparoscopic myomectomy to remove 8cm uterine fibroid w/ extension into the submucosa in  at [facility]. A single fundal incision 2/3 length of uterus was made and extended into the endometrium. Menorrhagia with regular cycle 2020    Low TSH level 2020     Return in about 2 weeks (around 3/20/2023) for OB/GYN at [facility] office with Dr. Ava Ramírez. The patient was counseled on signs and symptoms of  labor and recommended to go to the hospital if any of the signs are experienced. The patient was counseled on Labor & Delivery.    Route of delivery and counseling on vaginal, operative vaginal, and  sections were completed with the risks of each to both the patient as well as her baby. The patient will be scheduled for  due to PMHx of fibroids s/p myomectomy and the risk of complications during vaginal delivery. The possibility of a blood transfusion was discussed as well. The patient was not opposed to receiving a transfusion if needed. The patient was counseled on types of analgesia during labor and is considering general, spinal, or whatever is best for .       Yordan Morgan DO  Family Medicine Resident  [facility]  3/6/2023, 5:21 PM

## 2023-03-07 ENCOUNTER — TELEPHONE (OUTPATIENT)
Dept: OBGYN CLINIC | Age: 23
End: 2023-03-07

## 2023-03-07 ENCOUNTER — HOSPITAL ENCOUNTER (OUTPATIENT)
Age: 23
Discharge: HOME | End: 2023-03-07
Attending: OBSTETRICS & GYNECOLOGY | Admitting: OBSTETRICS & GYNECOLOGY
Payer: COMMERCIAL

## 2023-03-07 VITALS
SYSTOLIC BLOOD PRESSURE: 119 MMHG | TEMPERATURE: 96.4 F | HEART RATE: 86 BPM | OXYGEN SATURATION: 100 % | HEIGHT: 62 IN | RESPIRATION RATE: 16 BRPM | DIASTOLIC BLOOD PRESSURE: 76 MMHG | WEIGHT: 143 LBS | BODY MASS INDEX: 26.31 KG/M2

## 2023-03-07 PROBLEM — R11.10 VOMITING: Status: ACTIVE | Noted: 2023-03-07

## 2023-03-07 LAB
ALBUMIN SERPL BCG-MCNC: 3.7 G/DL (ref 3.5–5.1)
ALP SERPL-CCNC: 102 U/L (ref 38–126)
ALT SERPL W/O P-5'-P-CCNC: 9 U/L (ref 11–66)
ANION GAP SERPL CALC-SCNC: 12 MEQ/L (ref 8–16)
AST SERPL-CCNC: 15 U/L (ref 5–40)
BILIRUB SERPL-MCNC: 1 MG/DL (ref 0.3–1.2)
BUN SERPL-MCNC: 6 MG/DL (ref 7–22)
CALCIUM SERPL-MCNC: 8.9 MG/DL (ref 8.5–10.5)
CHLORIDE SERPL-SCNC: 102 MEQ/L (ref 98–111)
CO2 SERPL-SCNC: 22 MEQ/L (ref 23–33)
CREAT SERPL-MCNC: 0.3 MG/DL (ref 0.4–1.2)
DEPRECATED RDW RBC AUTO: 48.4 FL (ref 35–45)
ERYTHROCYTE [DISTWIDTH] IN BLOOD BY AUTOMATED COUNT: 14.7 % (ref 11.5–14.5)
FERRITIN SERPL IA-MCNC: 8 NG/ML (ref 10–291)
FOLATE SERPL-MCNC: 14.7 NG/ML (ref 4.8–24.2)
GFR SERPL CREATININE-BSD FRML MDRD: > 60 ML/MIN/1.73M2
GLUCOSE SERPL-MCNC: 71 MG/DL (ref 70–108)
HCT VFR BLD AUTO: 33.1 % (ref 37–47)
HGB BLD-MCNC: 10 GM/DL (ref 12–16)
IRON SERPL-MCNC: 39 UG/DL (ref 50–170)
MCH RBC QN AUTO: 27.2 PG (ref 26–33)
MCHC RBC AUTO-ENTMCNC: 30.2 GM/DL (ref 32.2–35.5)
MCV RBC AUTO: 90.2 FL (ref 81–99)
PLATELET # BLD AUTO: 230 THOU/MM3 (ref 130–400)
PMV BLD AUTO: 9.7 FL (ref 9.4–12.4)
POTASSIUM SERPL-SCNC: 4 MEQ/L (ref 3.5–5.2)
PROT SERPL-MCNC: 6.1 G/DL (ref 6.1–8)
RBC # BLD AUTO: 3.67 MILL/MM3 (ref 4.2–5.4)
SODIUM SERPL-SCNC: 136 MEQ/L (ref 135–145)
TIBC SERPL-MCNC: 552 UG/DL (ref 171–450)
VIT B12 SERPL-MCNC: 194 PG/ML (ref 211–911)
WBC # BLD AUTO: 11.5 THOU/MM3 (ref 4.8–10.8)

## 2023-03-07 PROCEDURE — 82728 ASSAY OF FERRITIN: CPT

## 2023-03-07 PROCEDURE — 80053 COMPREHEN METABOLIC PANEL: CPT

## 2023-03-07 PROCEDURE — 36415 COLL VENOUS BLD VENIPUNCTURE: CPT

## 2023-03-07 PROCEDURE — 83550 IRON BINDING TEST: CPT

## 2023-03-07 PROCEDURE — 82746 ASSAY OF FOLIC ACID SERUM: CPT

## 2023-03-07 PROCEDURE — 85027 COMPLETE CBC AUTOMATED: CPT

## 2023-03-07 PROCEDURE — 82607 VITAMIN B-12: CPT

## 2023-03-07 PROCEDURE — 83540 ASSAY OF IRON: CPT

## 2023-03-07 ASSESSMENT — ENCOUNTER SYMPTOMS
TROUBLE SWALLOWING: 0
WHEEZING: 0
SHORTNESS OF BREATH: 0
VOMITING: 1
ABDOMINAL PAIN: 0
DIARRHEA: 0
NAUSEA: 1

## 2023-03-07 NOTE — FLOWSHEET NOTE
Dr Nathaniel Gardner called back and informed of pts admission here at 35 weeks,  here with c/o feeling lightheaded and vomiting at work today. VS all normal, reactive FHR tracing, no contractions. Pt denies any pain. Taking water and ice chips without difficulty. Dr Nathaniel Gradner states he will talk to Dr Codie Cali and call us back.

## 2023-03-07 NOTE — TELEPHONE ENCOUNTER
Received call from patient stating that she is not feeling well. She states that she has a severe headache, has not been able to eat or drink since leaving her appointment yesterday. She also vomited after her appointment yesterday. She is feeling nauseous and vomiting. She states yesterday her blood sugar was a little bit low and her hemoglobin was also a little bit low. She has had history of iron deficiency anemia in the past.    She reports good fetal movement at this time, no loss of fluids, no bleeding or contractions. Upon chart review hemoglobin was 8.9, 1 hour GTT was 89, and blood pressure was 124/68 yesterday. Patient did not receive COVID or flu vaccines and this is a possibility of diagnosis. Patient states that blood pressure has not been an issue for her in the past.  However today she is feeling headache and lightheaded. She is wondering if she should be evaluated in the emergency department. At this time I recommended that patient be seen by provider. Instructed patient to go to the emergency department, from there she will be going to L&D for triage.     Electronically signed by Mellissa Mendoza MD on 3/7/23 at 9:29 AM EST

## 2023-03-07 NOTE — FLOWSHEET NOTE
Pt asking for off work note for the next couple days. Will discuss with Dr Blane England when he arrives to see pt.

## 2023-03-07 NOTE — FLOWSHEET NOTE
EFM off and discharge papers explained and copy given. Pt denies questions, feels comfortable going home. Will follow up in office.

## 2023-03-07 NOTE — DISCHARGE INSTRUCTIONS
Home Undelivered Discharge Instructions    After Discharge Orders:           Diet: regular diet   Increase fluid intake to 8-10 glasses of water daily    Rest: normal activity as tolerated    Other instructions: Do kick counts once a day on your baby. Choose the time of day your baby is most active. Get in a comfortable lying or sitting position and time how long it takes to feel 10 kicks, twists, turns, swishes, or rolls. Call Your Doctor if Any of the Following Occurs   Leaking fluid from vagina with or without contractions  Bright red vaginal bleeding occurs that is as heavy as or heavier than a period  Regular contractions are longer, stronger, and closer together  Noticeable decreased fetal movement  Elevated temperature >100.5°F and chills  Blurred vision, spots before eyes, unrelievable headache, severe facial swelling, or upper abdominal pain  Contact physician or hospital OB unit if signs of premature labor occur at ?36 weeks  Persistent or rhythmic low back pain that feels different than you are used to  Menstrual like cramps  Intestinal cramps with or without diarrhea  Pelvic pressure or rhythmic tightening that feels different than you are used to  [facility] or a gush of fluid from your vagina  Vaginal bleeding as heavy as a period  General Information     Difference between:  False Labor True Labor   1. Contractions often are irregular and don't consistently get closer together (called Denver-Melendez contractions) 1. Contractions come at regular intervals and, as time goes on, get closer together. 2. Contractions may often stop when you rest or with a position change. 2. Contractions continue despite movement or walking. Contractions get stronger and closer together with time. 3. Often felt in the lower abdomen. 3. Usually felt in back coming around to the front.      Reminder to Patient   Please bring all teaching sheets and discharge information with you if you return to the hospital or the physician's office/clinic for follow-up care. If you have any questions, please call: \"Call-A-Nurse\" [phone] or [phone]. Reviewed Dates   5/12/2007  Document developed by   Labor and Delivery Discharge Education Form  Disclaimer: We want you to understand more clearly each of the health conditions and procedures you may have. This patient leaflet is a summary of useful information to help you gain a better understanding of these health topics. Other information about this condition or procedure may be important for you to know. Please talk with your healthcare provider for more information about your special health needs. Coronavirus ([id number]): Pregnancy, Birth and Baby Care    What do you need know if you are pregnant regarding coronavirus (COVID-19)? · From what experts know so far, pregnant people do NOT seem more likely than others to get COVID-19 and do NOT have a higher risk of severe complications. What can you do to protect yourself from COVID-19? · Practice social distancing.  When you need to leave the home try to stay at least 6 feet away from other people. Avoid large crowds. · When you leave the house to prevent spreading sickness to others - Wear a facemask covering your mouth and nose. Remove by folding outside of mask together and place in container, wash daily or as soiled. · Wash your hands often by rubbing your hands with soap and water for at least 20 seconds, rinse, and dry with a paper towel that can be thrown away or may use hand  of at least 60% alcohol. Covering all surfaces of your hands by rubbing them together until dry. · Avoid touching your face with unwashed hands, especially your mouth, nose and eyes. · Avoid traveling. What should you do if you have or think you may have COVID-19? · For most infected, this is a mild illness and you will be able to recover at home.    · To avoid spreading to others:  ? Stay home except to get medical care.  ? Call a medical facility before you show up. This will help the staff prevent others from being exposed. ? Stay separate from others in your home, at least 6 feet. Use a separate bathroom, if possible. ? Wear a cloth facemask to cover your nose and mouth when you are around other people including at home. ? Clean your hands often. Wash your hands with soap and water for at least 20 seconds. ? Cough or sneeze into a tissue or your arm. Wash your hands immediately after. ? Dont share personal household items including towels and bedding. ? Clean and disinfect objects in your isolation area every day. · If you are in labor and you have, or think you may have COVID-19, call your OB care provider and the hospital birthing unit before you go, so the staff can properly prepare and protect you, your baby and others from being infected. Wear a mask when you leave your home, as you will be asked to continue to wear a mask during your time in the hospital.  · Mother-to-child transmission of COVID-19 during pregnancy is unlikely, but after birth a baby is susceptible to person-to-person spread. ? The determination of whether or not to separate you and your baby at the time of birth will be decided using shared decision-making between you and your clinical team.  ? After your baby is born, your health care provider may recommend you not hold your baby and/or that you stay in a separate room from your baby until you get better. ? If you and your baby are not , wear a facemask at all times and wash your hands thoroughly before touching, holding or feeding your baby.

## 2023-03-07 NOTE — FLOWSHEET NOTE
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 Dr Dwain Knowles notified of this.  states she has 2 patients left to see and she will be over.

## 2023-03-07 NOTE — H&P
Department of Obstetrics and Gynecology   Labor and Delivery H&P    Date of Admission: 3/7/2023    Subjective:    Membranes: intact    Pain:  none    Vaginal Drainage:no vaginal & no urethral discharge    Bleeding: None    Fetal Movement: the patients states that the baby moves as usual    Review of Systems   Constitutional:  Positive for appetite change. Negative for fever. HENT:  Negative for dental problem and trouble swallowing. Eyes:  Negative for visual disturbance. Respiratory:  Negative for shortness of breath and wheezing. Gastrointestinal:  Positive for nausea and vomiting. Negative for abdominal pain and diarrhea. Genitourinary:  Negative for decreased urine volume, difficulty urinating, pelvic pain, vaginal bleeding and vaginal discharge. Musculoskeletal:  Negative for gait problem. Skin:  Negative for wound. Neurological:  Positive for light-headedness. Negative for seizures, syncope and headaches. Psychiatric/Behavioral:  Negative for behavioral problems, confusion and self-injury. History of Present Illness: Gato Berry is a 25 y.o. female who presented today alone; Emesis During Pregnancy    The onset of her symptoms was 1 day ago, last night around 7pm, and have been fluctuating since then. She states that she saw her OB doctors yesterday and that once she was home, she began to experience nausea eventually having 3 episodes of emesis since last night. 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 Alleviating factors include rest and bland diet. Associated symptoms include light-headedness. Objective:   Vitals:    23 1014 23 1141   BP: 119/76    Pulse: 86    Resp: 16    Temp: (!) 96.4 °F (35.8 °C)    TempSrc: Temporal    SpO2: 100%    Weight:  143 lb (64.9 kg)   Height:  5' 2\" (1.575 m)     Physical Exam  Constitutional:       General: She is not in acute distress. Appearance: Normal appearance. She is normal weight.  She is not ill-appearing, toxic-appearing or diaphoretic. HENT:      Head: Normocephalic and atraumatic. Nose: Nose normal.      Mouth/Throat:      Mouth: Mucous membranes are moist.      Pharynx: Oropharynx is clear. Eyes:      General: No scleral icterus. Cardiovascular:      Rate and Rhythm: Normal rate and regular rhythm. Pulses: Normal pulses. Heart sounds: Normal heart sounds. No murmur heard. Pulmonary:      Effort: Pulmonary effort is normal. No respiratory distress. Breath sounds: Normal breath sounds. Abdominal:      General: Abdomen is flat. Bowel sounds are normal. There is no distension. Palpations: Abdomen is soft. Tenderness: There is no abdominal tenderness. Comments: Gravid uterus, midline, non-tender   Musculoskeletal:         General: No swelling, tenderness or signs of injury. Normal range of motion. Cervical back: No tenderness. Skin:     General: Skin is warm and dry. Coloration: Skin is not jaundiced or pale. Neurological:      Mental Status: She is alert and oriented to person, place, and time. Mental status is at baseline. Psychiatric:         Mood and Affect: Mood normal.         Behavior: Behavior normal.     Prior Labs:  Lab Results   Component Value Date    WBC 8.3 09/25/2022    HGB 8.9 03/06/2023    HCT 42.4 09/25/2022     09/25/2022    ALT 8 (L) 09/25/2022    AST 16 09/25/2022     09/25/2022    K 3.8 09/25/2022     09/25/2022    CREATININE 0.5 09/25/2022    BUN 9 09/25/2022    CO2 22 (L) 09/25/2022    TSH 0.754 02/26/2020    INR 1.02 05/05/2019       ASSESSMENT AND PLAN:  Will get CMP, CBC, Iron studis, B12 and folate labs ordered as outpatient. Patient declined Jake Dates. Will PO challenge with water and crackers, if patient tolerates will discharge with instructions to rest and return to work on Thursday.       Gay Odom DO 3/7/2023 12:00 PM

## 2023-03-07 NOTE — FLOWSHEET NOTE
Pt here at 28+1 weeks with c/o feeling light headed and vomiting since this morning while she was at work today. Pt denies any obstetrical complaints. No bleeding, leaking of fluid. Or cramping. Denies pain. Feeling frequent fetal mov't. Oriented to room and surroundings. To bathroom and gown on.

## 2023-03-07 NOTE — FLOWSHEET NOTE
Dr Alex Pinto (family med resident) paged. Pt has been seeing them in clinic, but switching to OB/GYN office since she will be scheduled .

## 2023-03-08 ENCOUNTER — TELEPHONE (OUTPATIENT)
Dept: FAMILY MEDICINE CLINIC | Age: 23
End: 2023-03-08

## 2023-03-08 NOTE — TELEPHONE ENCOUNTER
Patient called stating [employer] needs a the work restriction letter for the patient and proof she was sent to the ED yesterday so the patient can return to work tomorrow. Once this letter is completed I will fax it over to [employer] and call the patient back. Please advise.     [employer] Fax # - [phone]

## 2023-03-08 NOTE — LETTER
March 8, 2023       Blayne Leblanc YOB: 2000   [address] [address] Date of Visit:  3/8/2023       To Whom It May Concern: It is my medical opinion that Winston Roberts was evaluated at the Labor and Delivery Triage department yesterday 3/7/2023. She can return to work at this time. If you have any questions or concerns, please don't hesitate to call.     Sincerely,      Electronically signed by Ila Russell MD on 3/8/23 at 1:31 PM ANDERS Russell MD

## 2023-03-09 PROBLEM — O21.9 NAUSEA AND VOMITING OF PREGNANCY, ANTEPARTUM: Status: ACTIVE | Noted: 2023-03-09

## 2023-04-05 PROBLEM — Z3A.28 28 WEEKS GESTATION OF PREGNANCY: Status: ACTIVE | Noted: 2023-04-05

## 2023-05-08 ENCOUNTER — ANESTHESIA (OUTPATIENT)
Dept: LABOR AND DELIVERY | Age: 23
End: 2023-05-08
Payer: COMMERCIAL

## 2023-05-08 ENCOUNTER — ANESTHESIA EVENT (OUTPATIENT)
Dept: LABOR AND DELIVERY | Age: 23
End: 2023-05-08
Payer: COMMERCIAL

## 2023-05-08 ENCOUNTER — HOSPITAL ENCOUNTER (INPATIENT)
Age: 23
LOS: 4 days | Discharge: HOME | End: 2023-05-12
Attending: OBSTETRICS & GYNECOLOGY | Admitting: OBSTETRICS & GYNECOLOGY
Payer: COMMERCIAL

## 2023-05-08 LAB
ABO: NORMAL
AMPHETAMINES UR QL SCN: NEGATIVE
ANTIBODY SCREEN: NORMAL
BARBITURATES UR QL SCN: NEGATIVE
BENZODIAZ UR QL SCN: NEGATIVE
BZE UR QL SCN: NEGATIVE
CANNABINOIDS UR QL SCN: NEGATIVE
DEPRECATED RDW RBC AUTO: 50 FL (ref 35–45)
ERYTHROCYTE [DISTWIDTH] IN BLOOD BY AUTOMATED COUNT: 17.5 % (ref 11.5–14.5)
FENTANYL: NEGATIVE
HCT VFR BLD AUTO: 32.2 % (ref 37–47)
HGB BLD-MCNC: 9.3 GM/DL (ref 12–16)
MCH RBC QN AUTO: 22.8 PG (ref 26–33)
MCHC RBC AUTO-ENTMCNC: 28.9 GM/DL (ref 32.2–35.5)
MCV RBC AUTO: 78.9 FL (ref 81–99)
OPIATES UR QL SCN: NEGATIVE
OXYCODONE: NEGATIVE
PCP UR QL SCN: NEGATIVE
PLATELET # BLD AUTO: 239 THOU/MM3 (ref 130–400)
PMV BLD AUTO: 10.2 FL (ref 9.4–12.4)
RBC # BLD AUTO: 4.08 MILL/MM3 (ref 4.2–5.4)
RH FACTOR: NORMAL
RPR SER QL: NONREACTIVE
WBC # BLD AUTO: 10.1 THOU/MM3 (ref 4.8–10.8)

## 2023-05-08 PROCEDURE — 80307 DRUG TEST PRSMV CHEM ANLYZR: CPT

## 2023-05-08 PROCEDURE — 86923 COMPATIBILITY TEST ELECTRIC: CPT

## 2023-05-08 PROCEDURE — 2500000003 HC RX 250 WO HCPCS

## 2023-05-08 PROCEDURE — 6360000002 HC RX W HCPCS: Performed by: REGISTERED NURSE

## 2023-05-08 PROCEDURE — 6360000002 HC RX W HCPCS

## 2023-05-08 PROCEDURE — 3700000001 HC ADD 15 MINUTES (ANESTHESIA): Performed by: OBSTETRICS & GYNECOLOGY

## 2023-05-08 PROCEDURE — P9016 RBC LEUKOCYTES REDUCED: HCPCS

## 2023-05-08 PROCEDURE — 7100000000 HC PACU RECOVERY - FIRST 15 MIN: Performed by: OBSTETRICS & GYNECOLOGY

## 2023-05-08 PROCEDURE — 86592 SYPHILIS TEST NON-TREP QUAL: CPT

## 2023-05-08 PROCEDURE — 10907ZC DRAINAGE OF AMNIOTIC FLUID, THERAPEUTIC FROM PRODUCTS OF CONCEPTION, VIA NATURAL OR ARTIFICIAL OPENING: ICD-10-PCS | Performed by: OBSTETRICS & GYNECOLOGY

## 2023-05-08 PROCEDURE — 90471 IMMUNIZATION ADMIN: CPT | Performed by: OBSTETRICS & GYNECOLOGY

## 2023-05-08 PROCEDURE — A4216 STERILE WATER/SALINE, 10 ML: HCPCS | Performed by: OBSTETRICS & GYNECOLOGY

## 2023-05-08 PROCEDURE — 7100000001 HC PACU RECOVERY - ADDTL 15 MIN: Performed by: OBSTETRICS & GYNECOLOGY

## 2023-05-08 PROCEDURE — 2580000003 HC RX 258: Performed by: OBSTETRICS & GYNECOLOGY

## 2023-05-08 PROCEDURE — 6360000002 HC RX W HCPCS: Performed by: ANESTHESIOLOGY

## 2023-05-08 PROCEDURE — 86900 BLOOD TYPING SEROLOGIC ABO: CPT

## 2023-05-08 PROCEDURE — 86901 BLOOD TYPING SEROLOGIC RH(D): CPT

## 2023-05-08 PROCEDURE — 6370000000 HC RX 637 (ALT 250 FOR IP): Performed by: OBSTETRICS & GYNECOLOGY

## 2023-05-08 PROCEDURE — 90715 TDAP VACCINE 7 YRS/> IM: CPT | Performed by: OBSTETRICS & GYNECOLOGY

## 2023-05-08 PROCEDURE — 1220000000 HC SEMI PRIVATE OB R&B

## 2023-05-08 PROCEDURE — 85027 COMPLETE CBC AUTOMATED: CPT

## 2023-05-08 PROCEDURE — 2500000003 HC RX 250 WO HCPCS: Performed by: REGISTERED NURSE

## 2023-05-08 PROCEDURE — 2500000003 HC RX 250 WO HCPCS: Performed by: OBSTETRICS & GYNECOLOGY

## 2023-05-08 PROCEDURE — 3700000000 HC ANESTHESIA ATTENDED CARE: Performed by: OBSTETRICS & GYNECOLOGY

## 2023-05-08 PROCEDURE — 3609079900 HC CESAREAN SECTION: Performed by: OBSTETRICS & GYNECOLOGY

## 2023-05-08 PROCEDURE — 2709999900 HC NON-CHARGEABLE SUPPLY: Performed by: OBSTETRICS & GYNECOLOGY

## 2023-05-08 PROCEDURE — 6360000002 HC RX W HCPCS: Performed by: OBSTETRICS & GYNECOLOGY

## 2023-05-08 PROCEDURE — 86850 RBC ANTIBODY SCREEN: CPT

## 2023-05-08 RX ORDER — SODIUM CHLORIDE, SODIUM LACTATE, POTASSIUM CHLORIDE, AND CALCIUM CHLORIDE .6; .31; .03; .02 G/100ML; G/100ML; G/100ML; G/100ML
1000 INJECTION, SOLUTION INTRAVENOUS ONCE
Status: DISCONTINUED | OUTPATIENT
Start: 2023-05-08 | End: 2023-05-12 | Stop reason: HOSPADM

## 2023-05-08 RX ORDER — OXYTOCIN/0.9 % SODIUM CHLORIDE 30/500 ML
87.3 PLASTIC BAG, INJECTION (ML) INTRAVENOUS CONTINUOUS PRN
Status: DISPENSED | OUTPATIENT
Start: 2023-05-08 | End: 2023-05-10

## 2023-05-08 RX ORDER — MORPHINE SULFATE 4 MG/ML
4 INJECTION, SOLUTION INTRAMUSCULAR; INTRAVENOUS
Status: DISCONTINUED | OUTPATIENT
Start: 2023-05-09 | End: 2023-05-12 | Stop reason: HOSPADM

## 2023-05-08 RX ORDER — ONDANSETRON 4 MG/1
8 TABLET, ORALLY DISINTEGRATING ORAL EVERY 8 HOURS PRN
Status: DISCONTINUED | OUTPATIENT
Start: 2023-05-09 | End: 2023-05-12 | Stop reason: HOSPADM

## 2023-05-08 RX ORDER — BISACODYL 10 MG
10 SUPPOSITORY, RECTAL RECTAL DAILY PRN
Status: DISCONTINUED | OUTPATIENT
Start: 2023-05-08 | End: 2023-05-12 | Stop reason: HOSPADM

## 2023-05-08 RX ORDER — SODIUM CHLORIDE 0.9 % (FLUSH) 0.9 %
5-40 SYRINGE (ML) INJECTION PRN
Status: DISCONTINUED | OUTPATIENT
Start: 2023-05-08 | End: 2023-05-12 | Stop reason: HOSPADM

## 2023-05-08 RX ORDER — NALOXONE HYDROCHLORIDE 0.4 MG/ML
INJECTION, SOLUTION INTRAMUSCULAR; INTRAVENOUS; SUBCUTANEOUS PRN
Status: DISCONTINUED | OUTPATIENT
Start: 2023-05-08 | End: 2023-05-12 | Stop reason: HOSPADM

## 2023-05-08 RX ORDER — DIPHENHYDRAMINE HYDROCHLORIDE 50 MG/ML
25 INJECTION INTRAMUSCULAR; INTRAVENOUS EVERY 6 HOURS PRN
Status: DISCONTINUED | OUTPATIENT
Start: 2023-05-08 | End: 2023-05-12 | Stop reason: HOSPADM

## 2023-05-08 RX ORDER — OXYCODONE HYDROCHLORIDE 5 MG/1
10 TABLET ORAL EVERY 4 HOURS PRN
Status: DISCONTINUED | OUTPATIENT
Start: 2023-05-09 | End: 2023-05-12 | Stop reason: HOSPADM

## 2023-05-08 RX ORDER — MORPHINE SULFATE 2 MG/ML
2 INJECTION, SOLUTION INTRAMUSCULAR; INTRAVENOUS
Status: DISCONTINUED | OUTPATIENT
Start: 2023-05-09 | End: 2023-05-12 | Stop reason: HOSPADM

## 2023-05-08 RX ORDER — OXYCODONE HYDROCHLORIDE AND ACETAMINOPHEN 5; 325 MG/1; MG/1
2 TABLET ORAL EVERY 4 HOURS PRN
Status: DISPENSED | OUTPATIENT
Start: 2023-05-08 | End: 2023-05-09

## 2023-05-08 RX ORDER — TRISODIUM CITRATE DIHYDRATE AND CITRIC ACID MONOHYDRATE 500; 334 MG/5ML; MG/5ML
15 SOLUTION ORAL ONCE
Status: COMPLETED | OUTPATIENT
Start: 2023-05-08 | End: 2023-05-08

## 2023-05-08 RX ORDER — METHYLERGONOVINE MALEATE 0.2 MG/ML
200 INJECTION INTRAVENOUS PRN
Status: DISCONTINUED | OUTPATIENT
Start: 2023-05-08 | End: 2023-05-12 | Stop reason: HOSPADM

## 2023-05-08 RX ORDER — SODIUM CHLORIDE 0.9 % (FLUSH) 0.9 %
5-40 SYRINGE (ML) INJECTION EVERY 12 HOURS SCHEDULED
Status: DISCONTINUED | OUTPATIENT
Start: 2023-05-08 | End: 2023-05-12 | Stop reason: HOSPADM

## 2023-05-08 RX ORDER — KETOROLAC TROMETHAMINE 30 MG/ML
30 INJECTION, SOLUTION INTRAMUSCULAR; INTRAVENOUS EVERY 6 HOURS
Status: DISCONTINUED | OUTPATIENT
Start: 2023-05-08 | End: 2023-05-08 | Stop reason: SDUPTHER

## 2023-05-08 RX ORDER — FENTANYL CITRATE 50 UG/ML
INJECTION, SOLUTION INTRAMUSCULAR; INTRAVENOUS PRN
Status: DISCONTINUED | OUTPATIENT
Start: 2023-05-08 | End: 2023-05-08 | Stop reason: SDUPTHER

## 2023-05-08 RX ORDER — CARBOPROST TROMETHAMINE 250 UG/ML
250 INJECTION, SOLUTION INTRAMUSCULAR PRN
Status: DISCONTINUED | OUTPATIENT
Start: 2023-05-08 | End: 2023-05-12 | Stop reason: HOSPADM

## 2023-05-08 RX ORDER — MISOPROSTOL 200 UG/1
800 TABLET ORAL PRN
Status: DISCONTINUED | OUTPATIENT
Start: 2023-05-08 | End: 2023-05-12 | Stop reason: HOSPADM

## 2023-05-08 RX ORDER — ACETAMINOPHEN 325 MG/1
975 TABLET ORAL ONCE
Status: COMPLETED | OUTPATIENT
Start: 2023-05-08 | End: 2023-05-08

## 2023-05-08 RX ORDER — KETOROLAC TROMETHAMINE 30 MG/ML
INJECTION, SOLUTION INTRAMUSCULAR; INTRAVENOUS PRN
Status: DISCONTINUED | OUTPATIENT
Start: 2023-05-08 | End: 2023-05-08 | Stop reason: SDUPTHER

## 2023-05-08 RX ORDER — PHENYLEPHRINE HCL IN 0.9% NACL 1 MG/10 ML
SYRINGE (ML) INTRAVENOUS PRN
Status: DISCONTINUED | OUTPATIENT
Start: 2023-05-08 | End: 2023-05-08 | Stop reason: SDUPTHER

## 2023-05-08 RX ORDER — OXYTOCIN 10 [USP'U]/ML
INJECTION, SOLUTION INTRAMUSCULAR; INTRAVENOUS PRN
Status: DISCONTINUED | OUTPATIENT
Start: 2023-05-08 | End: 2023-05-08 | Stop reason: SDUPTHER

## 2023-05-08 RX ORDER — FERROUS SULFATE 325(65) MG
325 TABLET ORAL
Status: DISCONTINUED | OUTPATIENT
Start: 2023-05-09 | End: 2023-05-12 | Stop reason: HOSPADM

## 2023-05-08 RX ORDER — OXYCODONE HYDROCHLORIDE 5 MG/1
5 TABLET ORAL EVERY 4 HOURS PRN
Status: DISCONTINUED | OUTPATIENT
Start: 2023-05-09 | End: 2023-05-12 | Stop reason: HOSPADM

## 2023-05-08 RX ORDER — ACETAMINOPHEN 500 MG
1000 TABLET ORAL EVERY 8 HOURS SCHEDULED
Status: DISCONTINUED | OUTPATIENT
Start: 2023-05-08 | End: 2023-05-12 | Stop reason: HOSPADM

## 2023-05-08 RX ORDER — ONDANSETRON 2 MG/ML
4 INJECTION INTRAMUSCULAR; INTRAVENOUS EVERY 6 HOURS PRN
Status: DISCONTINUED | OUTPATIENT
Start: 2023-05-09 | End: 2023-05-12 | Stop reason: HOSPADM

## 2023-05-08 RX ORDER — BUPIVACAINE HYDROCHLORIDE 7.5 MG/ML
INJECTION, SOLUTION INTRASPINAL PRN
Status: DISCONTINUED | OUTPATIENT
Start: 2023-05-08 | End: 2023-05-08 | Stop reason: SDUPTHER

## 2023-05-08 RX ORDER — MORPHINE SULFATE 0.5 MG/ML
INJECTION, SOLUTION EPIDURAL; INTRATHECAL; INTRAVENOUS PRN
Status: DISCONTINUED | OUTPATIENT
Start: 2023-05-08 | End: 2023-05-08 | Stop reason: SDUPTHER

## 2023-05-08 RX ORDER — SIMETHICONE 80 MG
80 TABLET,CHEWABLE ORAL EVERY 6 HOURS PRN
Status: DISCONTINUED | OUTPATIENT
Start: 2023-05-08 | End: 2023-05-12 | Stop reason: HOSPADM

## 2023-05-08 RX ORDER — ONDANSETRON 2 MG/ML
INJECTION INTRAMUSCULAR; INTRAVENOUS PRN
Status: DISCONTINUED | OUTPATIENT
Start: 2023-05-08 | End: 2023-05-08 | Stop reason: SDUPTHER

## 2023-05-08 RX ORDER — SODIUM CHLORIDE 9 MG/ML
INJECTION, SOLUTION INTRAVENOUS PRN
Status: DISCONTINUED | OUTPATIENT
Start: 2023-05-08 | End: 2023-05-12 | Stop reason: HOSPADM

## 2023-05-08 RX ORDER — DEXAMETHASONE SODIUM PHOSPHATE 10 MG/ML
INJECTION, EMULSION INTRAMUSCULAR; INTRAVENOUS PRN
Status: DISCONTINUED | OUTPATIENT
Start: 2023-05-08 | End: 2023-05-08 | Stop reason: SDUPTHER

## 2023-05-08 RX ORDER — DIPHENHYDRAMINE HCL 25 MG
25 TABLET ORAL EVERY 6 HOURS PRN
Status: DISCONTINUED | OUTPATIENT
Start: 2023-05-08 | End: 2023-05-12 | Stop reason: HOSPADM

## 2023-05-08 RX ORDER — METOCLOPRAMIDE HYDROCHLORIDE 5 MG/ML
10 INJECTION INTRAMUSCULAR; INTRAVENOUS ONCE
Status: COMPLETED | OUTPATIENT
Start: 2023-05-08 | End: 2023-05-08

## 2023-05-08 RX ORDER — GLYCOPYRROLATE 0.2 MG/ML
INJECTION INTRAMUSCULAR; INTRAVENOUS PRN
Status: DISCONTINUED | OUTPATIENT
Start: 2023-05-08 | End: 2023-05-08 | Stop reason: SDUPTHER

## 2023-05-08 RX ORDER — IBUPROFEN 800 MG/1
800 TABLET ORAL EVERY 8 HOURS SCHEDULED
Status: DISCONTINUED | OUTPATIENT
Start: 2023-05-09 | End: 2023-05-09

## 2023-05-08 RX ORDER — EPHEDRINE SULFATE/0.9% NACL/PF 50 MG/5 ML
SYRINGE (ML) INTRAVENOUS PRN
Status: DISCONTINUED | OUTPATIENT
Start: 2023-05-08 | End: 2023-05-08 | Stop reason: SDUPTHER

## 2023-05-08 RX ORDER — ZOLPIDEM TARTRATE 5 MG/1
10 TABLET ORAL NIGHTLY PRN
Status: DISCONTINUED | OUTPATIENT
Start: 2023-05-08 | End: 2023-05-12 | Stop reason: HOSPADM

## 2023-05-08 RX ORDER — SODIUM CHLORIDE, SODIUM LACTATE, POTASSIUM CHLORIDE, CALCIUM CHLORIDE 600; 310; 30; 20 MG/100ML; MG/100ML; MG/100ML; MG/100ML
INJECTION, SOLUTION INTRAVENOUS CONTINUOUS
Status: DISCONTINUED | OUTPATIENT
Start: 2023-05-08 | End: 2023-05-12 | Stop reason: HOSPADM

## 2023-05-08 RX ORDER — ONDANSETRON 2 MG/ML
4 INJECTION INTRAMUSCULAR; INTRAVENOUS EVERY 6 HOURS PRN
Status: DISCONTINUED | OUTPATIENT
Start: 2023-05-08 | End: 2023-05-08 | Stop reason: HOSPADM

## 2023-05-08 RX ORDER — MODIFIED LANOLIN
OINTMENT (GRAM) TOPICAL
Status: DISCONTINUED | OUTPATIENT
Start: 2023-05-08 | End: 2023-05-12 | Stop reason: HOSPADM

## 2023-05-08 RX ORDER — DOCUSATE SODIUM 100 MG/1
100 CAPSULE, LIQUID FILLED ORAL 2 TIMES DAILY
Status: DISCONTINUED | OUTPATIENT
Start: 2023-05-08 | End: 2023-05-12 | Stop reason: HOSPADM

## 2023-05-08 RX ORDER — ONDANSETRON 2 MG/ML
4 INJECTION INTRAMUSCULAR; INTRAVENOUS EVERY 6 HOURS PRN
Status: ACTIVE | OUTPATIENT
Start: 2023-05-08 | End: 2023-05-09

## 2023-05-08 RX ORDER — KETOROLAC TROMETHAMINE 30 MG/ML
30 INJECTION, SOLUTION INTRAMUSCULAR; INTRAVENOUS EVERY 6 HOURS
Status: DISPENSED | OUTPATIENT
Start: 2023-05-08 | End: 2023-05-09

## 2023-05-08 RX ORDER — PRENATAL WITH FERROUS FUM AND FOLIC ACID 3080; 920; 120; 400; 22; 1.84; 3; 20; 10; 1; 12; 200; 27; 25; 2 [IU]/1; [IU]/1; MG/1; [IU]/1; MG/1; MG/1; MG/1; MG/1; MG/1; MG/1; UG/1; MG/1; MG/1; MG/1; MG/1
1 TABLET ORAL DAILY
Status: DISCONTINUED | OUTPATIENT
Start: 2023-05-08 | End: 2023-05-12 | Stop reason: HOSPADM

## 2023-05-08 RX ADMIN — Medication 100 MCG: at 08:24

## 2023-05-08 RX ADMIN — SODIUM CHLORIDE, POTASSIUM CHLORIDE, SODIUM LACTATE AND CALCIUM CHLORIDE: 600; 310; 30; 20 INJECTION, SOLUTION INTRAVENOUS at 22:46

## 2023-05-08 RX ADMIN — BUPIVACAINE HYDROCHLORIDE 1.6 ML: 7.5 INJECTION, SOLUTION SUBARACHNOID at 07:42

## 2023-05-08 RX ADMIN — MORPHINE SULFATE 0.2 MG: 0.5 INJECTION, SOLUTION EPIDURAL; INTRATHECAL; INTRAVENOUS at 07:42

## 2023-05-08 RX ADMIN — ONDANSETRON 4 MG: 2 INJECTION INTRAMUSCULAR; INTRAVENOUS at 07:51

## 2023-05-08 RX ADMIN — Medication 100 MCG: at 08:37

## 2023-05-08 RX ADMIN — Medication 100 MCG: at 08:27

## 2023-05-08 RX ADMIN — Medication 100 MCG: at 07:50

## 2023-05-08 RX ADMIN — KETOROLAC TROMETHAMINE 30 MG: 30 INJECTION, SOLUTION INTRAMUSCULAR at 08:32

## 2023-05-08 RX ADMIN — DEXAMETHASONE SODIUM PHOSPHATE 10 MG: 10 INJECTION, EMULSION INTRAMUSCULAR; INTRAVENOUS at 07:51

## 2023-05-08 RX ADMIN — Medication 100 MCG: at 08:18

## 2023-05-08 RX ADMIN — Medication 100 MCG: at 08:00

## 2023-05-08 RX ADMIN — SODIUM CHLORIDE, POTASSIUM CHLORIDE, SODIUM LACTATE AND CALCIUM CHLORIDE: 600; 310; 30; 20 INJECTION, SOLUTION INTRAVENOUS at 08:50

## 2023-05-08 RX ADMIN — KETOROLAC TROMETHAMINE 30 MG: 30 INJECTION, SOLUTION INTRAMUSCULAR; INTRAVENOUS at 18:39

## 2023-05-08 RX ADMIN — Medication 100 MCG: at 07:45

## 2023-05-08 RX ADMIN — ACETAMINOPHEN 975 MG: 325 TABLET ORAL at 06:58

## 2023-05-08 RX ADMIN — GLYCOPYRROLATE 0.2 MG: 0.2 INJECTION INTRAMUSCULAR; INTRAVENOUS at 07:51

## 2023-05-08 RX ADMIN — Medication 100 MCG: at 08:43

## 2023-05-08 RX ADMIN — SODIUM CITRATE AND CITRIC ACID MONOHYDRATE 15 ML: 500; 334 SOLUTION ORAL at 06:58

## 2023-05-08 RX ADMIN — TETANUS TOXOID, REDUCED DIPHTHERIA TOXOID AND ACELLULAR PERTUSSIS VACCINE, ADSORBED 0.5 ML: 5; 2.5; 8; 8; 2.5 SUSPENSION INTRAMUSCULAR at 15:18

## 2023-05-08 RX ADMIN — SODIUM CHLORIDE, POTASSIUM CHLORIDE, SODIUM LACTATE AND CALCIUM CHLORIDE: 600; 310; 30; 20 INJECTION, SOLUTION INTRAVENOUS at 06:25

## 2023-05-08 RX ADMIN — Medication 100 MCG: at 08:40

## 2023-05-08 RX ADMIN — FAMOTIDINE 20 MG: 10 INJECTION, SOLUTION INTRAVENOUS at 07:01

## 2023-05-08 RX ADMIN — Medication 100 MCG: at 08:15

## 2023-05-08 RX ADMIN — Medication 100 MCG: at 08:07

## 2023-05-08 RX ADMIN — Medication 87.3 MILLI-UNITS/MIN: at 09:02

## 2023-05-08 RX ADMIN — Medication 100 MCG: at 08:34

## 2023-05-08 RX ADMIN — Medication 100 MCG: at 08:30

## 2023-05-08 RX ADMIN — Medication 100 MCG: at 08:04

## 2023-05-08 RX ADMIN — FENTANYL CITRATE 20 MCG: 50 INJECTION, SOLUTION INTRAMUSCULAR; INTRAVENOUS at 07:42

## 2023-05-08 RX ADMIN — SODIUM CHLORIDE, POTASSIUM CHLORIDE, SODIUM LACTATE AND CALCIUM CHLORIDE: 600; 310; 30; 20 INJECTION, SOLUTION INTRAVENOUS at 15:27

## 2023-05-08 RX ADMIN — Medication 25 MG: at 08:45

## 2023-05-08 RX ADMIN — KETOROLAC TROMETHAMINE 30 MG: 30 INJECTION, SOLUTION INTRAMUSCULAR at 08:44

## 2023-05-08 RX ADMIN — Medication 100 MCG: at 08:21

## 2023-05-08 RX ADMIN — Medication 2000 MG: at 07:18

## 2023-05-08 RX ADMIN — SODIUM CHLORIDE, POTASSIUM CHLORIDE, SODIUM LACTATE AND CALCIUM CHLORIDE: 600; 310; 30; 20 INJECTION, SOLUTION INTRAVENOUS at 08:05

## 2023-05-08 RX ADMIN — Medication 100 MCG: at 07:55

## 2023-05-08 RX ADMIN — ACETAMINOPHEN 1000 MG: 500 TABLET ORAL at 22:52

## 2023-05-08 RX ADMIN — METOCLOPRAMIDE 10 MG: 5 INJECTION, SOLUTION INTRAMUSCULAR; INTRAVENOUS at 07:01

## 2023-05-08 RX ADMIN — Medication 100 MCG: at 08:10

## 2023-05-08 RX ADMIN — OXYTOCIN 20 ML: 10 INJECTION INTRAVENOUS at 08:05

## 2023-05-08 RX ADMIN — SODIUM CHLORIDE, POTASSIUM CHLORIDE, SODIUM LACTATE AND CALCIUM CHLORIDE: 600; 310; 30; 20 INJECTION, SOLUTION INTRAVENOUS at 05:25

## 2023-05-08 ASSESSMENT — PAIN DESCRIPTION - LOCATION
LOCATION: ABDOMEN
LOCATION: ABDOMEN;FINGER (COMMENT WHICH ONE)

## 2023-05-08 ASSESSMENT — PAIN SCALES - GENERAL
PAINLEVEL_OUTOF10: 5
PAINLEVEL_OUTOF10: 2
PAINLEVEL_OUTOF10: 1

## 2023-05-08 ASSESSMENT — PAIN DESCRIPTION - DESCRIPTORS
DESCRIPTORS: CRAMPING
DESCRIPTORS: SORE;CRAMPING

## 2023-05-08 ASSESSMENT — PAIN DESCRIPTION - ORIENTATION
ORIENTATION: LOWER
ORIENTATION: LOWER;MID

## 2023-05-08 ASSESSMENT — PAIN - FUNCTIONAL ASSESSMENT: PAIN_FUNCTIONAL_ASSESSMENT: ACTIVITIES ARE NOT PREVENTED

## 2023-05-08 NOTE — FLOWSHEET NOTE
Abd dressing is 3/4 saturated, Dr. Socorro Lanes notified. Dressing removed, steri strips intact and new silver dressing applied. Rachele care at this time. Pt tolerated well.

## 2023-05-08 NOTE — FLOWSHEET NOTE
Rachele care done for patient in bed, rolled side to side with ease. Pads and chux pad changed, gown changed. Abdominal binder applied. Patient repositioned in bed, tolerated well.

## 2023-05-08 NOTE — ANESTHESIA PROCEDURE NOTES
CSE Block    Patient location during procedure: OB  Start time: 5/8/2023 7:30 AM  End time: 5/8/2023 7:35 AM  Reason for block: primary anesthetic  Staffing  Performed: resident/CRNA   Resident/CRNA: MOHAN Sheth CRNA  CSE  Patient position: sitting  Prep: ChloraPrep  Patient monitoring: continuous pulse ox and frequent blood pressure checks  Approach: midline  Guidance: paresthesia technique  Provider prep: sterile gloves and mask  Spinal Needle  Needle type: pencil-tip   Needle gauge: 25 G  Needle length: 3.5 in  Assessment  Events: cerebrospinal fluidT6  Hemodynamics: stable  Preanesthetic Checklist  Completed: patient identified, IV checked, site marked, risks and benefits discussed, surgical/procedural consents, equipment checked, pre-op evaluation, timeout performed, anesthesia consent given, oxygen available, monitors applied/VS acknowledged, fire risk safety assessment completed and verbalized and blood product R/B/A discussed and consented

## 2023-05-08 NOTE — H&P
[address] [address]  History and Physical Update    Pt Name: Kwasi Bryant  MRN: [id number]  YOB: 2000  Date of evaluation: 2023    [] I have examined the patient and reviewed the H&P/Consult and there are no changes to the patient or plans. [x] I have examined the patient and reviewed the H&P/Consult and have noted the following changes:   20 yo  at 37 weeks prior 8 cm Myomectomy with thru and thru the endometrium. As a result we are delivering early to avoid uterine rupture. FHTs reactive  Questions answered      Discussion with the patient and/ or family for proposed care, treatment, services; benefits, risks, side effects; likelihood of achieving goals and potential problems that may occur during recuperation was had and all questions were answered. Discussion with the patient and/ or family of reasonable alternatives to the proposed care, treatment, services and the discussion of the risks, benefits, side effects related to the alternatives and the risk related to not receiving the proposed care treatment services was also had and all questions were answered. If this is for an elective surgical procedure then The patient was counseled at length about the risks of yo Covid-19 during their perioperative period and any recovery window from their procedure. The patient was made aware that yo Covid-19  may worsen their prognosis for recovering from their procedure  and lend to a higher morbidity and/or mortality risk. All material risks, benefits, and reasonable alternatives including postponing the procedure were discussed. The patient  does wish to proceed with the procedure at this time.              Daniel Estevez MD, MD  Electronically signed 2023 at 7:36 AM

## 2023-05-08 NOTE — L&D DELIVERY NOTE
Department of Obstetrics and Gynecology   Section Note    Pt Name: Kwasi Bryant  MRN: [id number] [place] #: [de-identified]  YOB: 2000  Procedure Performed By: Daniel Estevez MD, MD    Indications: previous myomectomy thru the endometrium. .. 8 cm    Pre-operative Diagnosis: 37 week pregnancy. Post-operative Diagnosis:  Same, Delivered, Living newbornFemale  Procedure:  primary low transverse  section  Findings:  Normal tubes, ovaries and uterus. Baby Female, Apgars  8,9 and weight of 7 lbs and 8 ounces. Uterine thinning fundally where myomectomy occurred. Estimated Blood Loss:  600ml         Specimens: None     Complications:  None         Condition: infant stable to general nursery and mother stable    Indications:     Kwasi Bryant is a 21 y.o. female  at 41w0d who presented for   section for  previous myomectomy. She understood the  risks and benefits and signed informed consent. Procedure: The patient was taken to the Operating Room where spinal anesthesia was placed. She was placed in the dorsal supine position with a leftward tilt and prepped and draped. A Pfannenstiel incision was made with the scalpel taken down to the fascia. The fascia was nicked in the midline. This incision extended laterally. The underlying rectus muscle dissected bluntly and with the Nicholson scissors. The peritoneum identified and entered sharply. This incision extended superiorly and inferior with good visualization of the bladder. The vesicouterine peritoneum was identified and entered sharply. This incision extended laterally and the bladder flap created digitally. The uterus was incised in a low transverse fashion and extended digitally. The vertex was removed from the uterus with the aid of vacuum assistance and fundal pressure. The rest of the baby delivered. The cord was clamped and cut and the baby was stimulated.   Cord blood was obtained, the placenta

## 2023-05-08 NOTE — ANESTHESIA PRE PROCEDURE
124/68       NPO Status:                                                                                 BMI:   Wt Readings from Last 3 Encounters:   05/08/23 162 lb (73.5 kg)   03/07/23 143 lb (64.9 kg)   03/06/23 143 lb 6.4 oz (65 kg)     Body mass index is 29.63 kg/m². CBC:   Lab Results   Component Value Date/Time    WBC 10.1 05/08/2023 05:00 AM    RBC 4.08 05/08/2023 05:00 AM    HGB 9.3 05/08/2023 05:00 AM    HCT 32.2 05/08/2023 05:00 AM    MCV 78.9 05/08/2023 05:00 AM     05/08/2023 05:00 AM       CMP:   Lab Results   Component Value Date/Time     03/07/2023 11:34 AM    K 4.0 03/07/2023 11:34 AM    K 3.8 09/25/2022 11:05 AM     03/07/2023 11:34 AM    CO2 22 03/07/2023 11:34 AM    BUN 6 03/07/2023 11:34 AM    CREATININE 0.3 03/07/2023 11:34 AM    LABGLOM >60 03/07/2023 11:34 AM    GLUCOSE 71 03/07/2023 11:34 AM    PROT 6.1 03/07/2023 11:34 AM    CALCIUM 8.9 03/07/2023 11:34 AM    BILITOT 1.0 03/07/2023 11:34 AM    ALKPHOS 102 03/07/2023 11:34 AM    AST 15 03/07/2023 11:34 AM    ALT 9 03/07/2023 11:34 AM       POC Tests: No results for input(s): POCGLU, POCNA, POCK, POCCL, POCBUN, POCHEMO, POCHCT in the last 72 hours.     Coags:   Lab Results   Component Value Date/Time    INR 1.02 05/05/2019 09:12 PM    APTT 28.7 05/05/2019 09:12 PM       HCG (If Applicable):   Lab Results   Component Value Date    PREGTESTUR POSITIVE 09/19/2022    PREGSERUM POSITIVE 09/25/2022        ABGs: No results found for: PHART, PO2ART, TSF6HBX, TSL4IHF, BEART, V1MCTNPR     Type & Screen (If Applicable):  Lab Results   Component Value Date    LABRH POS 05/08/2023       Drug/Infectious Status (If Applicable):  Lab Results   Component Value Date/Time    HEPCAB Negative 10/05/2022 12:20 PM       COVID-19 Screening (If Applicable):   Lab Results   Component Value Date/Time    COVID19 DETECTED 12/28/2021 05:43 PM           Anesthesia Evaluation  Patient summary reviewed and Nursing notes reviewed no history of anesthetic

## 2023-05-08 NOTE — FLOWSHEET NOTE
Notified Dr. Wilkes Dear 37+0 weeks admitted for scheduled  section. Patient prepped for procedure. Patient called out to RN stating she was beginning to get anxious about the procedure and would like something for her anxiety. MD states wait to administer anything until rounds by physicians are made.

## 2023-05-08 NOTE — L&D DELIVERY NOTE
Leticia Nath Girl [employer] [[id number]]      Labor Events     Labor: No   Steroids: None  Cervical Ripening Date/Time:      Antibiotics Received during Labor: Yes  Rupture Date/Time:  23 08:04:00   Rupture Type: AROM  Fluid Color: Clear  Fluid Odor: None  Fluid Volume: Large  Induction: None  Augmentation: None  Labor Complications: None              Anesthesia    Method: Spinal       Delivery Details      Delivery Date: 23 Delivery Time: 08:05:00   Delivery Type: , Low Transverse  Trial of Labor?: No   Categorization: Primary   Priority: Scheduled  Indications for : Prior Uterine Surgery       Skin Incision Type: Pfannenstiel  Uterine Incision: Low Transverse       [place] Presentation    Presentation: Vertex  _: Occiput  _: Anterior       Shoulder Dystocia    Shoulder Dystocia Present?: No       Assisted Delivery Details    Forceps Attempted?: No  Vacuum Extractor Attempted?: Yes                           Cord    Vessels: 3 Vessels  Complications: None  Delayed Cord Clamping?: Yes  Cord Clamped Date/Time: 2023 08:06:00  Cord Blood Disposition: Lab  Gases Sent?: No              Placenta    Date/Time: 2023 08:08:00  Removal: Spontaneous  Appearance: Intact  Disposition: Placenta Refrigerator       Lacerations    Episiotomy: None  Perineal Lacerations: None  Other Lacerations: no non-perineal laceration       Vaginal Counts    Initial Count Personnel: N/A  Initial Count Verified By: N/A  Final Count Personnel: N/A  Final Count Verified By: N/A       Blood Loss  Mother: Branden Smaller #[id number]     Start of Mother's Information      Delivery Blood Loss  23 0738 - 23 0856      Quantitative Blood Loss (mL) Hospital Encounter 700 grams    Total  700 mL               End of Mother's Information  Mother: Branden Blackwell #[id number]                Delivery Providers    Delivering clinician: Randee Dacosta MD     Provider Role    Samuel Smith

## 2023-05-08 NOTE — ANESTHESIA POSTPROCEDURE EVALUATION
Department of Anesthesiology  Postprocedure Note    Patient: Kelly Nance  MRN: [id number]  YOB: 2000  Date of evaluation: 2023      Procedure Summary     Date: 23 Room / Location: [facility]&D OR [address] [place] [address] Reasoner    Anesthesia Start: 8098 Anesthesia Stop: 0084    Procedure:  SECTION (Uterus) Diagnosis:       H/O myomectomy      (H/O myomectomy [Z98.890])    Surgeons: Opal Lin MD Responsible Provider: Kim Gayle MD    Anesthesia Type: spinal ASA Status: 2          Anesthesia Type: No value filed.     Jesus Phase I: Jesus Score: 9    Jesus Phase II: Jesus Score: 10      Anesthesia Post Evaluation    Patient location during evaluation: floor  Patient participation: complete - patient participated  Level of consciousness: awake  Airway patency: patent  Nausea & Vomiting: no vomiting and no nausea  Complications: no  Cardiovascular status: hemodynamically stable  Respiratory status: acceptable  Hydration status: stable

## 2023-05-08 NOTE — FLOWSHEET NOTE
Ashleigh RN at bedside, report given. Patient transported to [phone] via bed in stable condition with  in arms, significant other at side with belongings.

## 2023-05-08 NOTE — FLOWSHEET NOTE
Pt arrived to room 5a15 per bed from recovery. Oriented to room and plan of care discussed. Pt states pain goal is 5, medications discussed and ice applied to incision. Denies needs, call light is within reach.

## 2023-05-08 NOTE — ANESTHESIA POSTPROCEDURE EVALUATION
Department of Anesthesiology  Postprocedure Note    Patient: Trae Tom  MRN: [id number]  YOB: 2000  Date of evaluation: 2023      Procedure Summary     Date: 23 Room / Location: [facility] OR [facility]el    Anesthesia Start: 5537 Anesthesia Stop: 4442    Procedure:  SECTION (Uterus) Diagnosis:       H/O myomectomy      (H/O myomectomy [Z98.890])    Surgeons: Izabella Lopez MD Responsible Provider: Ramy Contreras MD    Anesthesia Type: spinal ASA Status: 2          Anesthesia Type: No value filed.     Jesus Phase I: Jesus Score: 9    Jesus Phase II: Jesus Score: 10      Anesthesia Post Evaluation    Patient location during evaluation: floor  Patient participation: complete - patient participated  Level of consciousness: awake  Airway patency: patent  Nausea & Vomiting: no vomiting and no nausea  Complications: no  Cardiovascular status: hemodynamically stable  Respiratory status: acceptable  Hydration status: stable

## 2023-05-08 NOTE — FLOWSHEET NOTE
37+0 of Dr. Chet Arriaza arrives to the unit for her scheduled  section. Reports positive fetal movement, denies vaginal bleeding or leaking of fluid. Patient to bathroom to void, informed of maternal drug testing policy in place on all laboring patients. Verbal consent received, paper consent to be signed and urine to be sent.

## 2023-05-09 LAB — HGB BLD-MCNC: 6.4 GM/DL (ref 12–16)

## 2023-05-09 PROCEDURE — 6360000002 HC RX W HCPCS: Performed by: ANESTHESIOLOGY

## 2023-05-09 PROCEDURE — 6370000000 HC RX 637 (ALT 250 FOR IP): Performed by: ANESTHESIOLOGY

## 2023-05-09 PROCEDURE — 1220000000 HC SEMI PRIVATE OB R&B

## 2023-05-09 PROCEDURE — 6370000000 HC RX 637 (ALT 250 FOR IP): Performed by: OBSTETRICS & GYNECOLOGY

## 2023-05-09 PROCEDURE — 36415 COLL VENOUS BLD VENIPUNCTURE: CPT

## 2023-05-09 PROCEDURE — 85018 HEMOGLOBIN: CPT

## 2023-05-09 PROCEDURE — 2580000003 HC RX 258: Performed by: OBSTETRICS & GYNECOLOGY

## 2023-05-09 RX ADMIN — SODIUM CHLORIDE, POTASSIUM CHLORIDE, SODIUM LACTATE AND CALCIUM CHLORIDE: 600; 310; 30; 20 INJECTION, SOLUTION INTRAVENOUS at 06:27

## 2023-05-09 RX ADMIN — OXYCODONE HYDROCHLORIDE 10 MG: 5 TABLET ORAL at 21:45

## 2023-05-09 RX ADMIN — OXYCODONE AND ACETAMINOPHEN 2 TABLET: 5; 325 TABLET ORAL at 06:19

## 2023-05-09 RX ADMIN — FERROUS SULFATE TAB 325 MG (65 MG ELEMENTAL FE) 325 MG: 325 (65 FE) TAB at 07:58

## 2023-05-09 RX ADMIN — SODIUM CHLORIDE, PRESERVATIVE FREE 10 ML: 5 INJECTION INTRAVENOUS at 23:51

## 2023-05-09 RX ADMIN — Medication 800 MG: at 17:25

## 2023-05-09 RX ADMIN — OXYCODONE HYDROCHLORIDE 5 MG: 5 TABLET ORAL at 14:31

## 2023-05-09 RX ADMIN — ACETAMINOPHEN 1000 MG: 500 TABLET ORAL at 21:46

## 2023-05-09 RX ADMIN — DOCUSATE SODIUM 100 MG: 100 CAPSULE, LIQUID FILLED ORAL at 07:58

## 2023-05-09 RX ADMIN — ACETAMINOPHEN 1000 MG: 500 TABLET ORAL at 14:31

## 2023-05-09 RX ADMIN — KETOROLAC TROMETHAMINE 30 MG: 30 INJECTION, SOLUTION INTRAMUSCULAR; INTRAVENOUS at 00:35

## 2023-05-09 RX ADMIN — OXYCODONE HYDROCHLORIDE 5 MG: 5 TABLET ORAL at 13:43

## 2023-05-09 RX ADMIN — KETOROLAC TROMETHAMINE 30 MG: 30 INJECTION, SOLUTION INTRAMUSCULAR; INTRAVENOUS at 06:18

## 2023-05-09 RX ADMIN — PRENATAL WITH FERROUS FUM AND FOLIC ACID 1 TABLET: 3080; 920; 120; 400; 22; 1.84; 3; 20; 10; 1; 12; 200; 27; 25; 2 TABLET ORAL at 07:58

## 2023-05-09 ASSESSMENT — PAIN SCALES - GENERAL
PAINLEVEL_OUTOF10: 4
PAINLEVEL_OUTOF10: 1
PAINLEVEL_OUTOF10: 6
PAINLEVEL_OUTOF10: 1
PAINLEVEL_OUTOF10: 5
PAINLEVEL_OUTOF10: 6
PAINLEVEL_OUTOF10: 7
PAINLEVEL_OUTOF10: 3
PAINLEVEL_OUTOF10: 7

## 2023-05-09 ASSESSMENT — PAIN DESCRIPTION - DESCRIPTORS
DESCRIPTORS: ACHING;DISCOMFORT
DESCRIPTORS: ACHING;BURNING
DESCRIPTORS: ACHING;DISCOMFORT
DESCRIPTORS: ACHING;DISCOMFORT;BURNING

## 2023-05-09 ASSESSMENT — PAIN - FUNCTIONAL ASSESSMENT
PAIN_FUNCTIONAL_ASSESSMENT: ACTIVITIES ARE NOT PREVENTED

## 2023-05-09 ASSESSMENT — PAIN DESCRIPTION - LOCATION
LOCATION: INCISION

## 2023-05-09 NOTE — FLOWSHEET NOTE
Assisted pt to side of bed to dangle, tolerated well. Sat at bedside for 30 minutes, tolerated well. Rachele care done, small lochia. Pads changed.

## 2023-05-09 NOTE — FLOWSHEET NOTE
Ambulated in room, up to sink to brush teeth, ambulated in room, richter catheter removed, pericare completed, gown removed, pt clothes on, returned to bed. Denies and dizziness or lightheadedness.

## 2023-05-09 NOTE — PROGRESS NOTES
Progress note    Subjective:     Postpartum Day 1:  Delivery    Pain is well controlled with current medications. The patient is not ambulating well. The patient is tolerating a normal diet. Flatus has been passed. Patient states she is doing well this morning, was in some increased pain las night but improved greatly with pain meds. Has not ambulated yet, will plan to remove richter and IV today and encourage ambulation. Denies any headaches, vision changes, or increasing abdominal pain. Per nursing, dressing changed yesterday after saturated, remaining well clean this AM. Only pinpoint spot of drainage. Objective:     Vitals:    23 0618   BP:    Pulse:    Resp: 16   Temp:    SpO2:          General:    alert, appears stated age, and cooperative   Uterine Fundus:   firm   Incision:  coveredhealing well, no significant drainage, no dehiscence, no significant erythema        CBC   Lab Results   Component Value Date    HGB 9.3 (L) 2023        Assessment:     Status post  section. Doing well postoperatively. Plan:     Plan to remove IV and richter today. Hgb down to 6.4.  Will recheck tomorrow AM.       Janeth Wright DO 2023 6:26 AM

## 2023-05-10 LAB
BASOPHILS ABSOLUTE: 0 THOU/MM3 (ref 0–0.1)
BASOPHILS NFR BLD AUTO: 0.3 %
DEPRECATED RDW RBC AUTO: 52.1 FL (ref 35–45)
EOSINOPHIL NFR BLD AUTO: 1.9 %
EOSINOPHILS ABSOLUTE: 0.2 THOU/MM3 (ref 0–0.4)
ERYTHROCYTE [DISTWIDTH] IN BLOOD BY AUTOMATED COUNT: 17.6 % (ref 11.5–14.5)
HCT VFR BLD AUTO: 21.5 % (ref 37–47)
HCT VFR BLD AUTO: 26.5 % (ref 37–47)
HGB BLD-MCNC: 6 GM/DL (ref 12–16)
HGB BLD-MCNC: 7.8 GM/DL (ref 12–16)
HYPOCHROMIA BLD QL SMEAR: PRESENT
IMM GRANULOCYTES # BLD AUTO: 0.08 THOU/MM3 (ref 0–0.07)
IMM GRANULOCYTES NFR BLD AUTO: 0.8 %
LYMPHOCYTES ABSOLUTE: 2 THOU/MM3 (ref 1–4.8)
LYMPHOCYTES NFR BLD AUTO: 19.7 %
MCH RBC QN AUTO: 22.8 PG (ref 26–33)
MCHC RBC AUTO-ENTMCNC: 27.9 GM/DL (ref 32.2–35.5)
MCV RBC AUTO: 81.7 FL (ref 81–99)
MONOCYTES ABSOLUTE: 0.8 THOU/MM3 (ref 0.4–1.3)
MONOCYTES NFR BLD AUTO: 8.1 %
NEUTROPHILS NFR BLD AUTO: 69.2 %
NRBC BLD AUTO-RTO: 0 /100 WBC
PLATELET # BLD AUTO: 205 THOU/MM3 (ref 130–400)
PMV BLD AUTO: 10.5 FL (ref 9.4–12.4)
POLYCHROMASIA BLD QL SMEAR: ABNORMAL
RBC # BLD AUTO: 2.63 MILL/MM3 (ref 4.2–5.4)
SCAN OF BLOOD SMEAR: NORMAL
SEGMENTED NEUTROPHILS ABSOLUTE COUNT: 7 THOU/MM3 (ref 1.8–7.7)
WBC # BLD AUTO: 10.1 THOU/MM3 (ref 4.8–10.8)

## 2023-05-10 PROCEDURE — 6370000000 HC RX 637 (ALT 250 FOR IP): Performed by: OBSTETRICS & GYNECOLOGY

## 2023-05-10 PROCEDURE — 36415 COLL VENOUS BLD VENIPUNCTURE: CPT

## 2023-05-10 PROCEDURE — 36430 TRANSFUSION BLD/BLD COMPNT: CPT

## 2023-05-10 PROCEDURE — 2580000003 HC RX 258: Performed by: OBSTETRICS & GYNECOLOGY

## 2023-05-10 PROCEDURE — 85025 COMPLETE CBC W/AUTO DIFF WBC: CPT

## 2023-05-10 PROCEDURE — 85014 HEMATOCRIT: CPT

## 2023-05-10 PROCEDURE — 85018 HEMOGLOBIN: CPT

## 2023-05-10 PROCEDURE — 1220000000 HC SEMI PRIVATE OB R&B

## 2023-05-10 PROCEDURE — 30233N1 TRANSFUSION OF NONAUTOLOGOUS RED BLOOD CELLS INTO PERIPHERAL VEIN, PERCUTANEOUS APPROACH: ICD-10-PCS | Performed by: OBSTETRICS & GYNECOLOGY

## 2023-05-10 RX ORDER — SODIUM CHLORIDE 9 MG/ML
INJECTION, SOLUTION INTRAVENOUS PRN
Status: COMPLETED | OUTPATIENT
Start: 2023-05-10 | End: 2023-05-10

## 2023-05-10 RX ORDER — DIPHENHYDRAMINE HCL 25 MG
25 TABLET ORAL SEE ADMIN INSTRUCTIONS
Status: COMPLETED | OUTPATIENT
Start: 2023-05-10 | End: 2023-05-10

## 2023-05-10 RX ORDER — ACETAMINOPHEN 325 MG/1
650 TABLET ORAL SEE ADMIN INSTRUCTIONS
Status: DISCONTINUED | OUTPATIENT
Start: 2023-05-10 | End: 2023-05-12 | Stop reason: HOSPADM

## 2023-05-10 RX ADMIN — ACETAMINOPHEN 1000 MG: 500 TABLET ORAL at 05:56

## 2023-05-10 RX ADMIN — ACETAMINOPHEN 1000 MG: 500 TABLET ORAL at 22:02

## 2023-05-10 RX ADMIN — OXYCODONE HYDROCHLORIDE 10 MG: 5 TABLET ORAL at 05:56

## 2023-05-10 RX ADMIN — OXYCODONE HYDROCHLORIDE 10 MG: 5 TABLET ORAL at 10:01

## 2023-05-10 RX ADMIN — OXYCODONE HYDROCHLORIDE 10 MG: 5 TABLET ORAL at 22:03

## 2023-05-10 RX ADMIN — SODIUM CHLORIDE, PRESERVATIVE FREE 10 ML: 5 INJECTION INTRAVENOUS at 22:02

## 2023-05-10 RX ADMIN — OXYCODONE HYDROCHLORIDE 10 MG: 5 TABLET ORAL at 18:08

## 2023-05-10 RX ADMIN — FERROUS SULFATE TAB 325 MG (65 MG ELEMENTAL FE) 325 MG: 325 (65 FE) TAB at 07:47

## 2023-05-10 RX ADMIN — Medication 800 MG: at 18:09

## 2023-05-10 RX ADMIN — PRENATAL WITH FERROUS FUM AND FOLIC ACID 1 TABLET: 3080; 920; 120; 400; 22; 1.84; 3; 20; 10; 1; 12; 200; 27; 25; 2 TABLET ORAL at 07:47

## 2023-05-10 RX ADMIN — Medication 800 MG: at 01:52

## 2023-05-10 RX ADMIN — OXYCODONE HYDROCHLORIDE 10 MG: 5 TABLET ORAL at 01:52

## 2023-05-10 RX ADMIN — Medication 800 MG: at 10:01

## 2023-05-10 RX ADMIN — OXYCODONE HYDROCHLORIDE 5 MG: 5 TABLET ORAL at 14:06

## 2023-05-10 RX ADMIN — ACETAMINOPHEN 1000 MG: 500 TABLET ORAL at 14:07

## 2023-05-10 RX ADMIN — DOCUSATE SODIUM 100 MG: 100 CAPSULE, LIQUID FILLED ORAL at 07:47

## 2023-05-10 RX ADMIN — SODIUM CHLORIDE: 9 INJECTION, SOLUTION INTRAVENOUS at 10:35

## 2023-05-10 RX ADMIN — SODIUM CHLORIDE, PRESERVATIVE FREE 10 ML: 5 INJECTION INTRAVENOUS at 07:49

## 2023-05-10 RX ADMIN — DOCUSATE SODIUM 100 MG: 100 CAPSULE, LIQUID FILLED ORAL at 22:02

## 2023-05-10 RX ADMIN — DIPHENHYDRAMINE HYDROCHLORIDE 25 MG: 25 TABLET ORAL at 10:39

## 2023-05-10 ASSESSMENT — PAIN DESCRIPTION - DESCRIPTORS: DESCRIPTORS: DISCOMFORT

## 2023-05-10 ASSESSMENT — PAIN SCALES - GENERAL
PAINLEVEL_OUTOF10: 8
PAINLEVEL_OUTOF10: 5
PAINLEVEL_OUTOF10: 7
PAINLEVEL_OUTOF10: 5

## 2023-05-10 ASSESSMENT — PAIN DESCRIPTION - LOCATION
LOCATION: BACK;INCISION
LOCATION: INCISION

## 2023-05-10 ASSESSMENT — PAIN - FUNCTIONAL ASSESSMENT: PAIN_FUNCTIONAL_ASSESSMENT: ACTIVITIES ARE NOT PREVENTED

## 2023-05-10 NOTE — PROGRESS NOTES
Progress note    Subjective:     Postpartum Day 2:  Delivery    Pain is well controlled with current medications. The patient is ambulating well. The patient is tolerating a normal diet. Flatus has been passed. Fatigued but no dizziness. Objective:     Vitals:    05/10/23 1001   BP:    Pulse:    Resp: 16   Temp:    SpO2:          General:    alert, appears stated age, and cooperative   Uterine Fundus:   firm   Incision:  healing well, no significant drainage, no dehiscence, no significant erythema        CBC   Lab Results   Component Value Date    WBC 10.1 05/10/2023    HGB 6.0 (LL) 05/10/2023    HCT 21.5 (L) 05/10/2023     05/10/2023        Assessment:     Status post  section. Doing well postoperatively. Postoperative course complicated by Acute blood loss on Chronic anemia. Plan:     Continue current care. Disc blood transfusion r/b/a and pt desires to proceed with 1 unit PRBCS with shared decision making.      Mckenzie Montero MD 5/10/2023 10:16 AM

## 2023-05-11 LAB
DEPRECATED RDW RBC AUTO: 56.2 FL (ref 35–45)
ERYTHROCYTE [DISTWIDTH] IN BLOOD BY AUTOMATED COUNT: 18.6 % (ref 11.5–14.5)
HCT VFR BLD AUTO: 24.6 % (ref 37–47)
HGB BLD-MCNC: 7 GM/DL (ref 12–16)
MCH RBC QN AUTO: 24 PG (ref 26–33)
MCHC RBC AUTO-ENTMCNC: 28.5 GM/DL (ref 32.2–35.5)
MCV RBC AUTO: 84.2 FL (ref 81–99)
PLATELET # BLD AUTO: 263 THOU/MM3 (ref 130–400)
PMV BLD AUTO: 10.3 FL (ref 9.4–12.4)
RBC # BLD AUTO: 2.92 MILL/MM3 (ref 4.2–5.4)
WBC # BLD AUTO: 11.7 THOU/MM3 (ref 4.8–10.8)

## 2023-05-11 PROCEDURE — 1220000000 HC SEMI PRIVATE OB R&B

## 2023-05-11 PROCEDURE — 85027 COMPLETE CBC AUTOMATED: CPT

## 2023-05-11 PROCEDURE — 36415 COLL VENOUS BLD VENIPUNCTURE: CPT

## 2023-05-11 PROCEDURE — 2580000003 HC RX 258: Performed by: OBSTETRICS & GYNECOLOGY

## 2023-05-11 PROCEDURE — 6370000000 HC RX 637 (ALT 250 FOR IP): Performed by: OBSTETRICS & GYNECOLOGY

## 2023-05-11 RX ADMIN — DOCUSATE SODIUM 100 MG: 100 CAPSULE, LIQUID FILLED ORAL at 07:53

## 2023-05-11 RX ADMIN — OXYCODONE HYDROCHLORIDE 10 MG: 5 TABLET ORAL at 06:14

## 2023-05-11 RX ADMIN — DOCUSATE SODIUM 100 MG: 100 CAPSULE, LIQUID FILLED ORAL at 22:43

## 2023-05-11 RX ADMIN — SODIUM CHLORIDE, PRESERVATIVE FREE 10 ML: 5 INJECTION INTRAVENOUS at 22:45

## 2023-05-11 RX ADMIN — Medication 800 MG: at 18:24

## 2023-05-11 RX ADMIN — ACETAMINOPHEN 1000 MG: 500 TABLET ORAL at 14:08

## 2023-05-11 RX ADMIN — FERROUS SULFATE TAB 325 MG (65 MG ELEMENTAL FE) 325 MG: 325 (65 FE) TAB at 07:53

## 2023-05-11 RX ADMIN — OXYCODONE HYDROCHLORIDE 5 MG: 5 TABLET ORAL at 18:23

## 2023-05-11 RX ADMIN — Medication 800 MG: at 10:17

## 2023-05-11 RX ADMIN — OXYCODONE HYDROCHLORIDE 5 MG: 5 TABLET ORAL at 14:08

## 2023-05-11 RX ADMIN — Medication 800 MG: at 02:12

## 2023-05-11 RX ADMIN — PRENATAL WITH FERROUS FUM AND FOLIC ACID 1 TABLET: 3080; 920; 120; 400; 22; 1.84; 3; 20; 10; 1; 12; 200; 27; 25; 2 TABLET ORAL at 07:53

## 2023-05-11 RX ADMIN — OXYCODONE HYDROCHLORIDE 10 MG: 5 TABLET ORAL at 22:42

## 2023-05-11 RX ADMIN — OXYCODONE HYDROCHLORIDE 10 MG: 5 TABLET ORAL at 02:13

## 2023-05-11 RX ADMIN — OXYCODONE HYDROCHLORIDE 10 MG: 5 TABLET ORAL at 10:16

## 2023-05-11 RX ADMIN — ACETAMINOPHEN 1000 MG: 500 TABLET ORAL at 22:43

## 2023-05-11 RX ADMIN — SODIUM CHLORIDE, PRESERVATIVE FREE 10 ML: 5 INJECTION INTRAVENOUS at 07:55

## 2023-05-11 RX ADMIN — ACETAMINOPHEN 1000 MG: 500 TABLET ORAL at 06:15

## 2023-05-11 ASSESSMENT — PAIN DESCRIPTION - ONSET
ONSET: ON-GOING
ONSET: ON-GOING

## 2023-05-11 ASSESSMENT — PAIN SCALES - GENERAL
PAINLEVEL_OUTOF10: 8
PAINLEVEL_OUTOF10: 8
PAINLEVEL_OUTOF10: 6
PAINLEVEL_OUTOF10: 5
PAINLEVEL_OUTOF10: 7
PAINLEVEL_OUTOF10: 8
PAINLEVEL_OUTOF10: 7
PAINLEVEL_OUTOF10: 8
PAINLEVEL_OUTOF10: 8

## 2023-05-11 ASSESSMENT — PAIN DESCRIPTION - FREQUENCY
FREQUENCY: INTERMITTENT
FREQUENCY: INTERMITTENT

## 2023-05-11 ASSESSMENT — PAIN DESCRIPTION - ORIENTATION
ORIENTATION: ANTERIOR
ORIENTATION: ANTERIOR

## 2023-05-11 ASSESSMENT — PAIN DESCRIPTION - LOCATION
LOCATION: ABDOMEN
LOCATION: INCISION
LOCATION: ABDOMEN
LOCATION: ABDOMEN;INCISION
LOCATION: ABDOMEN;INCISION

## 2023-05-11 ASSESSMENT — PAIN - FUNCTIONAL ASSESSMENT
PAIN_FUNCTIONAL_ASSESSMENT: ACTIVITIES ARE NOT PREVENTED

## 2023-05-11 ASSESSMENT — PAIN DESCRIPTION - DESCRIPTORS
DESCRIPTORS: ACHING;CRAMPING;DISCOMFORT
DESCRIPTORS: DISCOMFORT
DESCRIPTORS: DISCOMFORT
DESCRIPTORS: ACHING;DISCOMFORT
DESCRIPTORS: ACHING;DISCOMFORT
DESCRIPTORS: DISCOMFORT
DESCRIPTORS: DISCOMFORT

## 2023-05-11 ASSESSMENT — PAIN DESCRIPTION - PAIN TYPE
TYPE: ACUTE PAIN
TYPE: ACUTE PAIN

## 2023-05-11 NOTE — DISCHARGE INSTRUCTIONS
After Your  Delivery Discharge Instructions      After Discharge Orders:  No future appointments. [unfilled]        Call the Physician with any  signs and symptoms:    Warning signs regarding incision:  \"Popping\" of stitches or staples  Foul smelling discharge or pus  More redness or streaks around incision than before    Incision care:  Keep incision dry and covered (if necessary)  No tub baths until OK'd by your Physician or Midwife  You may use cold compresses on incision site  Wash the incision daily with Hibiclens until the bottle is gone. After your delivery - signs and symptoms to watch for:  Fever - Oral temperature greater than 100.4 degrees Fahrenheit  Foul-smelling vaginal discharge  Headache unrelieved by \"pain meds\"  Difficulty urinating  Breasts reddened, hard, hot to the touch  Nipple discharge which is foul-smelling or contains pus  Increased pain at the site of the surgical incision  Difficulty breathing with or without chest pain  New calf pain especially if only on one side  Sudden, continuing increased vaginal bleeding with or without clots  Unrelieved feelings of:   Inability to cope  Sadness  Anxiety  Lack of interest in baby  Insomnia  Crying     What to do at home:  See patient education handouts for full information  Resume activity gradually   Don't lift anything heavier than baby and carrier until OK'd by your Physician or Midwife  No sex until OK'd by your Physician or Midwife  Take care of yourself by sleeping/resting as much as possible  Eat regular nutritious meals  Let someone else care for you, your baby, and housework as much as possible   Take pain medication as prescribed whenever you need them  Wear compression stockings if prescribed   To avoid/relieve constipation take stool softeners if advised   Drink lots of water/fruit juices  Increase fiber in your diet  Breast care: Wear support bra ; use lanolin ointment/cream as needed  Do not drive until

## 2023-05-11 NOTE — PROGRESS NOTES
Progress note    Subjective:     Postpartum Day 3:  Delivery    Pain is well controlled with current medications. The patient is ambulating well. The patient is tolerating a normal diet. Flatus has been passed. Patient states she is doing well this morning. States she still feels tired when she sits up but not as bad as prior day. Has been able to ambulate well with no concerns. Objective:     Vitals:    23 0614   BP:    Pulse:    Resp: 14   Temp:    SpO2:          General:    alert, appears stated age, and cooperative   Uterine Fundus:   firm   Incision:  healing well, no significant drainage, no dehiscence, no significant erythema        CBC   Lab Results   Component Value Date    WBC 10.1 05/10/2023    HGB 7.8 (L) 05/10/2023    HCT 26.5 (L) 05/10/2023     05/10/2023        Assessment:     Status post  section. Doing well postoperatively. Plan:     Continue current care. Received 1 units PRBC's yesterday, Hgb this AM 7.0.  Likely stay one more day and check CBC in AM.     Janeth Wright DO 2023 6:29 AM

## 2023-05-11 NOTE — DISCHARGE SUMMARY
Obstetrical Discharge Form      Pt Name: Elvira Bates  MRN: [id number] Itzel #: [de-identified]  YOB: 2000      Admitting Diagnosis  IUP, prior 8 cm fundal incision on the uterus  OB History          1    Para   1    Term   1            AB        Living   1         SAB        IAB        Ectopic        Molar        Multiple   0    Live Births   1                Reasons for Admission on 2023  4:40 AM  H/O myomectomy [Z98.890]  Encounter for  delivery without indication [O82]  No comment available  C section. Intrapartum Procedures        Multiple birth?: No                 Postpartum/Operative Complications     Acute on chronic anemia from acute blood loss   Data  Information for the patient's :  Lashaunlia Nurys Girl Nida Dimas [[id number]]   female   Birth Weight: 7 lb 7.9 oz (3.4 kg)     Discharge Diagnosis     Status post . Discharge Information  Current Discharge Medication List   oxyCODONE-acetaminophen (PERCOCET) 5-325 MG per tablet Take 1 tablet by mouth every 6 hours as needed for Pain for up to 7 days. Max Daily Amount: 4 tablets  Qty: 28 tablet, Refills: 0     Comments: Reduce doses taken as pain becomes manageable  Associated Diagnoses:  delivery due to previous obstetrical trauma, delivered, current hospitalization       ibuprofen (IBU) 600 MG tablet Take 1 tablet by mouth every 6 hours as needed for Pain  Qty: 60 tablet, Refills: 3        CONTINUE these medications which have NOT CHANGED    Details   Prenatal Vit-Fe Fumarate-FA (PRENATAL VITAMIN) 27-0.8 MG TABS Take 1 tablet by mouth daily  Qty: 30 tablet, Refills: 8           STOP taking these medications       pimecrolimus (ELIDEL) 1 % cream Comments:   Reason for Stopping:         fluorometholone (FML S.O.P.) 0.1 % ophthalmic ointment Comments:   Reason for Stopping:               No discharge procedures on file.     Status post : Low Cervical, Transverse  Discharge to:

## 2023-05-12 VITALS
DIASTOLIC BLOOD PRESSURE: 73 MMHG | SYSTOLIC BLOOD PRESSURE: 108 MMHG | HEIGHT: 62 IN | BODY MASS INDEX: 29.81 KG/M2 | HEART RATE: 108 BPM | TEMPERATURE: 98.4 F | WEIGHT: 162 LBS | RESPIRATION RATE: 16 BRPM | OXYGEN SATURATION: 99 %

## 2023-05-12 LAB
DEPRECATED RDW RBC AUTO: 56.9 FL (ref 35–45)
ERYTHROCYTE [DISTWIDTH] IN BLOOD BY AUTOMATED COUNT: 19.4 % (ref 11.5–14.5)
HCT VFR BLD AUTO: 25.7 % (ref 37–47)
HGB BLD-MCNC: 7.4 GM/DL (ref 12–16)
MCH RBC QN AUTO: 24.3 PG (ref 26–33)
MCHC RBC AUTO-ENTMCNC: 28.8 GM/DL (ref 32.2–35.5)
MCV RBC AUTO: 84.3 FL (ref 81–99)
PLATELET # BLD AUTO: 243 THOU/MM3 (ref 130–400)
PMV BLD AUTO: 10.1 FL (ref 9.4–12.4)
RBC # BLD AUTO: 3.05 MILL/MM3 (ref 4.2–5.4)
WBC # BLD AUTO: 15.2 THOU/MM3 (ref 4.8–10.8)

## 2023-05-12 PROCEDURE — 36415 COLL VENOUS BLD VENIPUNCTURE: CPT

## 2023-05-12 PROCEDURE — 6370000000 HC RX 637 (ALT 250 FOR IP): Performed by: OBSTETRICS & GYNECOLOGY

## 2023-05-12 PROCEDURE — 85027 COMPLETE CBC AUTOMATED: CPT

## 2023-05-12 RX ORDER — OXYCODONE HYDROCHLORIDE AND ACETAMINOPHEN 5; 325 MG/1; MG/1
1 TABLET ORAL EVERY 6 HOURS PRN
Qty: 28 TABLET | Refills: 0 | Status: SHIPPED | OUTPATIENT
Start: 2023-05-12 | End: 2023-05-19

## 2023-05-12 RX ORDER — IBUPROFEN 600 MG/1
600 TABLET ORAL EVERY 6 HOURS PRN
Qty: 60 TABLET | Refills: 3 | Status: SHIPPED | OUTPATIENT
Start: 2023-05-12

## 2023-05-12 RX ADMIN — OXYCODONE HYDROCHLORIDE 10 MG: 5 TABLET ORAL at 08:17

## 2023-05-12 RX ADMIN — OXYCODONE HYDROCHLORIDE 10 MG: 5 TABLET ORAL at 03:05

## 2023-05-12 RX ADMIN — Medication 800 MG: at 10:47

## 2023-05-12 RX ADMIN — OXYCODONE HYDROCHLORIDE 5 MG: 5 TABLET ORAL at 13:51

## 2023-05-12 RX ADMIN — DOCUSATE SODIUM 100 MG: 100 CAPSULE, LIQUID FILLED ORAL at 08:17

## 2023-05-12 RX ADMIN — FERROUS SULFATE TAB 325 MG (65 MG ELEMENTAL FE) 325 MG: 325 (65 FE) TAB at 08:17

## 2023-05-12 RX ADMIN — Medication 800 MG: at 03:06

## 2023-05-12 RX ADMIN — ACETAMINOPHEN 1000 MG: 500 TABLET ORAL at 08:17

## 2023-05-12 ASSESSMENT — PAIN DESCRIPTION - LOCATION
LOCATION: INCISION
LOCATION: ABDOMEN;INCISION
LOCATION: ABDOMEN;INCISION

## 2023-05-12 ASSESSMENT — PAIN SCALES - GENERAL
PAINLEVEL_OUTOF10: 5
PAINLEVEL_OUTOF10: 5
PAINLEVEL_OUTOF10: 10
PAINLEVEL_OUTOF10: 6
PAINLEVEL_OUTOF10: 10

## 2023-05-12 ASSESSMENT — PAIN DESCRIPTION - DESCRIPTORS
DESCRIPTORS: ACHING;SORE
DESCRIPTORS: CRAMPING;SHARP
DESCRIPTORS: CRAMPING;SHARP

## 2023-05-12 ASSESSMENT — PAIN DESCRIPTION - FREQUENCY: FREQUENCY: INTERMITTENT

## 2023-05-12 ASSESSMENT — PAIN - FUNCTIONAL ASSESSMENT: PAIN_FUNCTIONAL_ASSESSMENT: ACTIVITIES ARE NOT PREVENTED

## 2023-05-12 ASSESSMENT — PAIN DESCRIPTION - ONSET: ONSET: ON-GOING

## 2023-05-12 ASSESSMENT — PAIN DESCRIPTION - ORIENTATION
ORIENTATION: RIGHT
ORIENTATION: RIGHT;LOWER

## 2023-05-12 ASSESSMENT — PAIN DESCRIPTION - PAIN TYPE: TYPE: ACUTE PAIN

## 2023-05-12 NOTE — PROGRESS NOTES
Infant has roomed in with mother this shift  except due to maternal exhaustion. Benefits of rooming in discussed.

## 2023-05-12 NOTE — PLAN OF CARE
Problem: Pain  Goal: Verbalizes/displays adequate comfort level or baseline comfort level  2023 by Jeramie Murphy RN  Outcome: Progressing  Flowsheets (Taken 2023)  Verbalizes/displays adequate comfort level or baseline comfort level:   Encourage patient to monitor pain and request assistance   Assess pain using appropriate pain scale   Administer analgesics based on type and severity of pain and evaluate response   Implement non-pharmacological measures as appropriate and evaluate response   Consider cultural and social influences on pain and pain management   Notify Licensed Independent Practitioner if interventions unsuccessful or patient reports new pain     Problem: Vaginal Birth or  Section  Goal: Fetal and maternal status remain reassuring during the birth process  Description:  Birth OB-Pregnancy care plan goal which identifies if the fetal and maternal status remain reassuring during the birth process  Outcome: Progressing  Flowsheets (Taken 2023)  Fetal and Maternal Status Remain Reassuring During the Birth Process:   Deep vein thrombosis prophylaxis ( delivery)   Surgical antibiotic prophylaxis ( delivery)   Monitor uterine activity   Monitor fetal heart rate   Monitor vital signs     Problem: Infection - Adult  Goal: Absence of infection during hospitalization  Outcome: Progressing  Flowsheets (Taken 2023)  Absence of infection during hospitalization:   Assess and monitor for signs and symptoms of infection   Monitor lab/diagnostic results   Monitor all insertion sites i.e., indwelling lines, tubes and drains   Administer medications as ordered   Instruct and encourage patient and family to use good hand hygiene technique     Problem: Safety - Adult  Goal: Free from fall injury  Outcome: Progressing  Flowsheets (Taken 2023)  Free From Fall Injury:   Instruct family/caregiver on patient safety   Based on caregiver fall risk screen,
Problem: Postpartum  Goal: Appropriate maternal -  bonding  Description:  Postpartum OB-Pregnancy care plan goal which identifies if the mother and  are bonding appropriately  2023 1104 by Cristi Welch RN  Outcome: Progressing  Note: Bonding with baby, participating in infant care. 2023 1104 by Cristi Welch RN  Outcome: Progressing     Problem: Pain  Goal: Verbalizes/displays adequate comfort level or baseline comfort level  2023 1104 by Cristi Welch RN  Outcome: Progressing  2023 1104 by Cristi Welch RN  Outcome: Progressing     Problem: Infection - Adult  Goal: Absence of infection at discharge  2023 1104 by Cristi Welch RN  Outcome: Progressing  Note: Vital signs and assessments WNL. 2023 1104 by Cristi Welch RN  Outcome: Progressing  Goal: Absence of infection during hospitalization  2023 1104 by Cristi Welch RN  Outcome: Progressing  Note: Vital signs and assessments WNL. 2023 1104 by Cristi Welch RN  Outcome: Progressing     Problem: Safety - Adult  Goal: Free from fall injury  2023 1104 by Cristi Welch RN  Outcome: Progressing  Note: No falls  2023 1104 by Cristi Welch RN  Outcome: Progressing     Problem: Discharge Planning  Goal: Discharge to home or other facility with appropriate resources  2023 1104 by Cristi Welch RN  Outcome: Progressing  Note: Remains in hospital, discussed possible discharge needs. 2023 1104 by Cristi Welch RN  Outcome: Progressing     Problem: Chronic Conditions and Co-morbidities  Goal: Patient's chronic conditions and co-morbidity symptoms are monitored and maintained or improved  2023 1104 by Cristi Welch RN  Outcome: Progressing  Note: Monitor labs,  2023 by Cristi Welch RN  Outcome: Progressing   Care plan reviewed with patient and she contributes to goal setting and voices understanding of plan of care.
Problem: Postpartum  Goal: Appropriate maternal -  bonding  Description:  Postpartum OB-Pregnancy care plan goal which identifies if the mother and  are bonding appropriately  2023 by Sarah Dorman RN  Outcome: Progressing  Note: Bonding with baby, participating in infant care.        Problem: Pain  Goal: Verbalizes/displays adequate comfort level or baseline comfort level  2023 by Sarah Dorman RN  Outcome: Progressing  Flowsheets (Taken 2023)  Verbalizes/displays adequate comfort level or baseline comfort level: Encourage patient to monitor pain and request assistance     Problem: Infection - Adult  Goal: Absence of infection at discharge  2023 by Sarah Dorman RN  Outcome: Progressing  Flowsheets (Taken 2023)  Absence of infection at discharge:   Assess and monitor for signs and symptoms of infection   Monitor lab/diagnostic results     Problem: Safety - Adult  Goal: Free from fall injury  2023 by Sarah Dorman RN  Outcome: Progressing  Flowsheets (Taken 2023)  Free From Fall Injury: Instruct family/caregiver on patient safety     Problem: Discharge Planning  Goal: Discharge to home or other facility with appropriate resources  2023 by Sarah Dorman RN  Outcome: Progressing  Flowsheets (Taken 2023)  Discharge to home or other facility with appropriate resources: Identify barriers to discharge with patient and caregiver     Problem: Chronic Conditions and Co-morbidities  Goal: Patient's chronic conditions and co-morbidity symptoms are monitored and maintained or improved  2023 by Sarah Dorman RN  Outcome: Progressing  Flowsheets (Taken 5/10/2023 2011 by Eri Adams RN)  Care Plan - Patient's Chronic Conditions and Co-Morbidity Symptoms are Monitored and Maintained or Improved: Monitor and assess patient's chronic conditions and comorbid symptoms for stability, deterioration, or
Problem: Postpartum  Goal: Appropriate maternal -  bonding  Description:  Postpartum OB-Pregnancy care plan goal which identifies if the mother and  are bonding appropriately  5/10/2023 114 by Sivakumar Downey RN  Outcome: Progressing  Note: Bonding with baby, participating in infant care.       Problem: Pain  Goal: Verbalizes/displays adequate comfort level or baseline comfort level  5/10/2023 114 by Sivakumar Downey RN  Outcome: Progressing  Flowsheets (Taken 5/10/2023 0744)  Verbalizes/displays adequate comfort level or baseline comfort level:   Encourage patient to monitor pain and request assistance   Assess pain using appropriate pain scale   Administer analgesics based on type and severity of pain and evaluate response   Implement non-pharmacological measures as appropriate and evaluate response   Consider cultural and social influences on pain and pain management     Problem: Infection - Adult  Goal: Absence of infection at discharge  5/10/2023 114 by Sivakumar Downey RN  Outcome: Progressing  Flowsheets (Taken 5/10/2023 0744)  Absence of infection at discharge:   Assess and monitor for signs and symptoms of infection   Monitor lab/diagnostic results   Monitor all insertion sites i.e., indwelling lines, tubes and drains   Instruct and encourage patient and family to use good hand hygiene technique     Problem: Infection - Adult  Goal: Absence of infection during hospitalization  5/10/2023 114 by Sivakumar Downey RN  Outcome: Progressing  Flowsheets (Taken 5/10/2023 0744)  Absence of infection during hospitalization:   Assess and monitor for signs and symptoms of infection   Monitor lab/diagnostic results   Monitor all insertion sites i.e., indwelling lines, tubes and drains   Instruct and encourage patient and family to use good hand hygiene technique     Problem: Safety - Adult  Goal: Free from fall injury  5/10/2023 114 by Sivakumar Downey RN  Outcome: Progressing  Flowsheets (Taken 5/10/2023
Problem: Postpartum  Goal: Appropriate maternal -  bonding  Description:  Postpartum OB-Pregnancy care plan goal which identifies if the mother and  are bonding appropriately  5/10/2023 2011 by Ruchi Tovar RN  Outcome: Progressing  Note: Bonding with baby, participating in infant care.        Problem: Pain  Goal: Verbalizes/displays adequate comfort level or baseline comfort level  5/10/2023 2011 by Ruchi Tovar RN  Outcome: Progressing  Flowsheets (Taken 5/10/2023 2011)  Verbalizes/displays adequate comfort level or baseline comfort level: Encourage patient to monitor pain and request assistance     Problem: Infection - Adult  Goal: Absence of infection at discharge  5/10/2023 2011 by Ruchi Tovar RN  Outcome: Progressing  Flowsheets (Taken 5/10/2023 2011)  Absence of infection at discharge:   Monitor lab/diagnostic results   Assess and monitor for signs and symptoms of infection     Problem: Infection - Adult  Goal: Absence of infection during hospitalization  5/10/2023 2011 by Ruchi Tovar RN  Outcome: Progressing  Flowsheets (Taken 5/10/2023 2011)  Absence of infection during hospitalization:   Monitor lab/diagnostic results   Assess and monitor for signs and symptoms of infection     Problem: Safety - Adult  Goal: Free from fall injury  5/10/2023 2011 by Ruhci Tovar RN  Outcome: Progressing  Flowsheets (Taken 5/10/2023 2011)  Free From Fall Injury: Instruct family/caregiver on patient safety     Problem: Discharge Planning  Goal: Discharge to home or other facility with appropriate resources  5/10/2023 2011 by Ruchi Tovar RN  Outcome: Progressing  Flowsheets (Taken 5/10/2023 2011)  Discharge to home or other facility with appropriate resources: Identify barriers to discharge with patient and caregiver     Problem: Chronic Conditions and Co-morbidities  Goal: Patient's chronic conditions and co-morbidity symptoms are monitored and maintained or improved  5/10/2023 2011 by
Problem: Postpartum  Goal: Appropriate maternal -  bonding  Description:  Postpartum OB-Pregnancy care plan goal which identifies if the mother and  are bonding appropriately  Outcome: Progressing  Note: Bonding with baby, participating in infant care. Problem: Pain  Goal: Verbalizes/displays adequate comfort level or baseline comfort level  Outcome: Progressing  Note: Pain controlled with po meds. Discussed ice for perineal pain and/or incisional pain or the use of warm blanket/heating pad for uterine cramps. Pt states her pain goal 4/10 has been met. Problem: Infection - Adult  Goal: Absence of infection at discharge  Outcome: Progressing  Note: Vital signs and assessments WNL. Goal: Absence of infection during hospitalization  Outcome: Progressing     Problem: Safety - Adult  Goal: Free from fall injury  Outcome: Progressing  Note: No falls     Problem: Discharge Planning  Goal: Discharge to home or other facility with appropriate resources  Outcome: Progressing  Note: Remains in hospital, discussed possible discharge needs. Problem: Chronic Conditions and Co-morbidities  Goal: Patient's chronic conditions and co-morbidity symptoms are monitored and maintained or improved  Outcome: Progressing   Care plan reviewed with patient and she contributes to goal setting and voices understanding of plan of care.
Malva Red, RN)  Care Plan - Patient's Chronic Conditions and Co-Morbidity Symptoms are Monitored and Maintained or Improved: Monitor and assess patient's chronic conditions and comorbid symptoms for stability, deterioration, or improvement   Plan of care discussed with mother and she contributes to goal setting and voices understanding of plan of care.
encourage patient and family to use good hand hygiene technique     Problem: Safety - Adult  Goal: Free from fall injury  5/9/2023 0010 by Donnal Ormond, RN  Outcome: Progressing  Flowsheets (Taken 5/8/2023 1950)  Free From Fall Injury: Instruct family/caregiver on patient safety     Problem: Discharge Planning  Goal: Discharge to home or other facility with appropriate resources  5/9/2023 0010 by Donnal Ormond, RN  Outcome: Progressing  Flowsheets  Taken 5/8/2023 1950 by Donnal Ormond, RN  Discharge to home or other facility with appropriate resources: Identify barriers to discharge with patient and caregiver  Taken 5/8/2023 1600 by Aly Archuleta RN  Discharge to home or other facility with appropriate resources: Identify barriers to discharge with patient and caregiver     Problem: Chronic Conditions and Co-morbidities  Goal: Patient's chronic conditions and co-morbidity symptoms are monitored and maintained or improved  5/9/2023 0010 by Donnal Ormond, RN  Outcome: Progressing  Flowsheets (Taken 5/8/2023 1236 by John Dawkins RN)  Care Plan - Patient's Chronic Conditions and Co-Morbidity Symptoms are Monitored and Maintained or Improved: Monitor and assess patient's chronic conditions and comorbid symptoms for stability, deterioration, or improvement   Plan of care discussed with mother and she contributes to goal setting and voices understanding of plan of care.
resources and transportation as appropriate   Identify discharge learning needs (meds, wound care, etc)   Refer to discharge planning if patient needs post-hospital services based on physician order or complex needs related to functional status, cognitive ability or social support system  Note: Plan of care discussed     Problem: Chronic Conditions and Co-morbidities  Goal: Patient's chronic conditions and co-morbidity symptoms are monitored and maintained or improved  Outcome: Progressing  Flowsheets  Taken 5/8/2023 1236 by Julio Bloom RN  Care Plan - Patient's Chronic Conditions and Co-Morbidity Symptoms are Monitored and Maintained or Improved: Monitor and assess patient's chronic conditions and comorbid symptoms for stability, deterioration, or improvement  Taken 5/8/2023 0606 by Manisha Holloway RN  Care Plan - Patient's Chronic Conditions and Co-Morbidity Symptoms are Monitored and Maintained or Improved:   Monitor and assess patient's chronic conditions and comorbid symptoms for stability, deterioration, or improvement   Collaborate with multidisciplinary team to address chronic and comorbid conditions and prevent exacerbation or deterioration   Update acute care plan with appropriate goals if chronic or comorbid symptoms are exacerbated and prevent overall improvement and discharge   Care plan reviewed with patient. Patient verbalize understanding of the plan of care and contribute to goal setting.

## 2023-05-12 NOTE — PROGRESS NOTES
Progress note    Subjective:     Postpartum Day 4:  Delivery    Pain is moderately controlled with current medications. The patient is ambulating well. The patient is tolerating a normal diet. Flatus has been passed. Patient states she ambulated a lot yesterday and was having intense pain last evening to her lower right abdomen that radiated to her back and right thigh. She states the pain medications did help slightly. Pain is no longer present this morning. States she is ready to go home today. We will monitor pain this morning when she ambulates. Objective:     Vitals:    23 0305   BP: 110/76   Pulse: 96   Resp: 16   Temp: 98 °F (36.7 °C)   SpO2:          General:    alert, appears stated age, and cooperative   Uterine Fundus:   firm   Incision:  healing well, no dehiscence, no significant erythema, slight clear drainage present        CBC   Lab Results   Component Value Date    WBC 15.2 (H) 2023    HGB 7.4 (L) 2023    HCT 25.7 (L) 2023     2023        Assessment:     Status post  section. Doing well postoperatively. Plan:     Plan to discharge home with standard precautions after patient ambulates this morning. If pain is controlled okay for discharge.        Kenna Hauser,  2023 7:27 AM

## 2023-05-12 NOTE — FLOWSHEET NOTE
Post birth warning signs education paper given and reviewed, teaching complete. [place] postpartum depression screening discussed with patient, instructed to contact her healthcare provider if her score is > 10. Patient voiced understanding. Mother's blood type is O+. Baby's blood type is O+.   Mother did not receive Rhogam.

## (undated) DEVICE — SUTURE ABSORBABLE MONOFILAMENT 0 CTX 60 IN VIO PDS + PDP990G

## (undated) DEVICE — GLOVE ORANGE PI 7   MSG9070

## (undated) DEVICE — SOLUTION IRRIG 1000ML STRL H2O USP PLAS POUR BTL

## (undated) DEVICE — SOLUTION SCRB 4OZ 4% CHG H2O AIDED FOR PREOPERATIVE SKIN

## (undated) DEVICE — SUTURE CHROMIC GUT SZ 1 L36IN ABSRB BRN L48MM CTX 1/2 CIR 905H

## (undated) DEVICE — APPLICATOR PREP 26ML 0.7% IOD POVACRYLEX 74% ISO ALC ST

## (undated) DEVICE — SUTURE VCRL + SZ 0 L27IN ABSRB UD L36MM CT-1 1/2 CIR VCPP41D

## (undated) DEVICE — DRESSING ANITMICROBIAL FOAM OPTIFOAM POSTOP STRP 35 X 10 IN

## (undated) DEVICE — SUTURE PLN GUT SZ 2-0 L27IN ABSRB YELLOWISH TAN L36MM CT-1 843H

## (undated) DEVICE — SUTURE VCRL SZ 4-0 L27IN ABSRB UD L60MM KS STR REV CUT NDL J662H

## (undated) DEVICE — PACK PROCEDURE SURG C SECT SRMC LF

## (undated) DEVICE — SOLUTION IRRIG 1000ML 0.9% SOD CHL USP POUR PLAS BTL